# Patient Record
Sex: MALE
[De-identification: names, ages, dates, MRNs, and addresses within clinical notes are randomized per-mention and may not be internally consistent; named-entity substitution may affect disease eponyms.]

---

## 2020-06-17 ENCOUNTER — HOSPITAL ENCOUNTER (INPATIENT)
Dept: HOSPITAL 53 - M PCU | Age: 20
LOS: 1 days | Discharge: HOME | DRG: 101 | End: 2020-06-18
Attending: INTERNAL MEDICINE | Admitting: INTERNAL MEDICINE
Payer: COMMERCIAL

## 2020-06-17 VITALS — BODY MASS INDEX: 19.47 KG/M2 | HEIGHT: 74 IN | WEIGHT: 151.68 LBS

## 2020-06-17 VITALS — SYSTOLIC BLOOD PRESSURE: 128 MMHG | DIASTOLIC BLOOD PRESSURE: 58 MMHG

## 2020-06-17 VITALS — DIASTOLIC BLOOD PRESSURE: 58 MMHG | SYSTOLIC BLOOD PRESSURE: 123 MMHG

## 2020-06-17 DIAGNOSIS — D72.829: ICD-10-CM

## 2020-06-17 DIAGNOSIS — R56.9: Primary | ICD-10-CM

## 2020-06-17 LAB
ALBUMIN SERPL BCG-MCNC: 4.1 GM/DL (ref 3.2–5.2)
ALT SERPL W P-5'-P-CCNC: 33 U/L (ref 12–78)
BILIRUB SERPL-MCNC: 0.8 MG/DL (ref 0.2–1)
BUN SERPL-MCNC: 18 MG/DL (ref 7–18)
CALCIUM SERPL-MCNC: 8.8 MG/DL (ref 8.5–10.1)
CHLORIDE SERPL-SCNC: 107 MEQ/L (ref 98–107)
CO2 SERPL-SCNC: 26 MEQ/L (ref 21–32)
CREAT SERPL-MCNC: 0.84 MG/DL (ref 0.7–1.3)
GFR SERPL CREATININE-BSD FRML MDRD: > 60 ML/MIN/{1.73_M2} (ref 60–?)
GLUCOSE SERPL-MCNC: 96 MG/DL (ref 70–100)
HCT VFR BLD AUTO: 40.2 % (ref 42–52)
HGB BLD-MCNC: 13.2 G/DL (ref 13.5–17.5)
INR PPP: 1.27
MCH RBC QN AUTO: 29.7 PG (ref 27–33)
MCHC RBC AUTO-ENTMCNC: 32.8 G/DL (ref 32–36.5)
MCV RBC AUTO: 90.5 FL (ref 80–96)
PLATELET # BLD AUTO: 207 10^3/UL (ref 150–450)
POTASSIUM SERPL-SCNC: 4.1 MEQ/L (ref 3.5–5.1)
PROT SERPL-MCNC: 6.9 GM/DL (ref 6.4–8.2)
PROTHROMBIN TIME: 15.6 SECONDS (ref 11.8–14)
RBC # BLD AUTO: 4.44 10^6/UL (ref 4.3–6.1)
SODIUM SERPL-SCNC: 137 MEQ/L (ref 136–145)
WBC # BLD AUTO: 11.8 10^3/UL (ref 4–10)

## 2020-06-17 RX ADMIN — ACETAMINOPHEN PRN MG: 325 TABLET ORAL at 18:08

## 2020-06-17 RX ADMIN — ONDANSETRON PRN MG: 2 INJECTION INTRAMUSCULAR; INTRAVENOUS at 18:08

## 2020-06-17 RX ADMIN — ACETAMINOPHEN PRN MG: 325 TABLET ORAL at 23:50

## 2020-06-17 RX ADMIN — LEVETIRACETAM SCH MLS/HR: 500 INJECTION, SOLUTION, CONCENTRATE INTRAVENOUS at 20:50

## 2020-06-17 NOTE — REP
Portable chest x-ray:  Single view.

 

History:  Altered mental status.

 

Findings:  The lungs are well inflated and clear.  The pleural angles are sharp.

Heart size is normal.  Pulmonary vasculature is not increased.  No acute bony

abnormality is seen.

 

Impression:

 

Negative portable chest x-ray.

 

 

Electronically Signed by

Wade Rojas MD 06/17/2020 04:38 P

## 2020-06-17 NOTE — REPVR
PROCEDURE INFORMATION: 

Exam: MR Head Without Contrast 

Exam date and time: 6/17/2020 8:01 PM 

Age: 20 years old 

Clinical indication: Screening exam; Additional info: Seizure 



TECHNIQUE: 

Imaging protocol: MR of the head without contrast. 



COMPARISON: 

No relevant prior studies available. 



FINDINGS: 

Brain: Normal. No acute infarct. No hemorrhage. No significant white matter 

disease. No edema.  

Ventricles: Normal. No ventriculomegaly. 

Bones/joints: Unremarkable. 

Sinuses: Normal as visualized. No acute sinusitis. 

Mastoid air cells: Normal as visualized. No mastoid effusion. 

Orbits: Unremarkable. 



Soft tissues: Unremarkable. 



IMPRESSION: 

No acute intracranial abnormality.



Electronically signed by: Terrence Huffman On 06/17/2020  20:34:47 PM

## 2020-06-17 NOTE — HPEPDOC
General


Date of Admission


Jun 17, 2020 at 15:03


Date of Service:  Jun 17, 2020


Chief Complaint


The patient is a 20-year-old male who presented to [facility] as a transfer from 

[facility]





History of Present Illness


Patient is a 20-year-old  male with no significant past medical history

was presented to emergency Medical Center as a transfer from [facility]. Patient was reported to have a seizure episode in the field and was 

brought to the emergency room for further evaluation.





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 

He reported that he wasnt feeling well prior to the explosion. Patient reported

that he would diffuse and was walking to [place] getting to [place] 

went off, and the next thing patient remembered was being on the ground.





Patient was advised by his Rogelio that he had a seizure. Patient denied any 

tongue biting. Denied any loss of control and his bowel or bladder.  does 

report a remote history of a seizure as a child. However, he attributes that to 

an infection.





While in the emergency room a [facility] patient developed another 

seizure episode, and was given Ativan for control. Patient was given a total of 

4 mg of Ativan and 1000 mg of Keppra and transferred to [facility]

for further evaluation, after discussion with neurology.





Currently patient denies any lightheadedness, dizziness, nausea, vomiting, chest

pain, shortness breath, cough, fevers, chills, abdominal pain, constipation, 

diarrhea or urinary discomfort.





Patient reports his appetite is normal. Reports some increase in his weight 

secondary to the weight gain program that hes using.





Home Medications


No Active Prescriptions or Reported Meds





Allergies


Coded Allergies:  


     No Known Allergies (Unverified , 6/17/20)





Past Medical History


Medical History


No significant past medical history


Surgical History


Right hand fracture status post point





Family History


- No family history of seizures or malignancy in his parents


- Maternal grandmother with a history of lung cancer





Social History


- Denies the use of tobacco or illicit drugs; she reports social alcohol use


- Denies recent travel or sick contacts


- Lives at [facility]





Review of Systems


Other systems


10 point review of systems complete, all negative otherwise stated in HPI





Vital Signs


- Vitals: /58, HR 83, RR 20, Sat 20, Temp 98.4F


- General: Lying in bed, No acute distress, Speaking in full sentences, AAOx3


- HEENT: NC, AT, PERRLA, EOMI


- CVS: RRR, +S1S2


- Lungs: Fair air entry bilaterally, No appreciable wheezing / rales / rhonchi 


- Abdomen: Soft, Non-distended, Non-tender


- Extremities: No lower extremity edema, No calf tenderness


- Neuro: No focal motor or sensory deficit


- Skin: No visible rashes





Laboratory Data


Labs 24H


Laboratory Tests 2


6/17/20 15:28: Nucleated Red Blood Cells % (auto) 0.0


CBC/BMP


Laboratory Tests


6/17/20 15:28








Microbiology





Microbiology


6/17/20 Blood Culture, Received


          Pending


6/17/20 Blood Culture, Received


          Pending





Plan / VTE


VTE Prophylaxis Ordered?:  Yes





Plan


Plan


New onset seizure


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 

[employer]


- Patient subsequently developed another seizure episode while in the emergency 

room and [facility]


- Patients history reveals a remote history of seizure episode at age 1, 

possibly secondary to infection


- CT head 6/17: No acute findings. A [facility]


- Will check ECHO / c/w Telemetry monitoring 


- Case was discussed with neurology; will get MRI / EEG 


- Will start Keppra 500 BID


- c/w Seizure precautions 





Leukocytosis - likely 2/2 reactive etiology, less likely 2/2 infectious etiology


- Review of systems negative for any source of infection


- Patient is hemodynamically stable and afebrile


- UA completed a [facility] was negative for any infectious causes


- Chest x-ray completed a [facility] was negative for any acute 

findings


- Will repeat UA


- Will hold off on antibiotics





Questionable A. fib history


- Currently patient denies any chest pain, shortness of breath or palpitations


- Patient is currently in sinus rhythm with appropriate rate


- EKG will be repeated here


- Will trend troponins


- Will hold off on full anticoagulation/rate control





DVT prophylaxis 


- Will start TEDs/SARAH Marx MD                Jun 17, 2020 16:05

## 2020-06-18 VITALS — DIASTOLIC BLOOD PRESSURE: 58 MMHG | SYSTOLIC BLOOD PRESSURE: 119 MMHG

## 2020-06-18 VITALS — DIASTOLIC BLOOD PRESSURE: 56 MMHG | SYSTOLIC BLOOD PRESSURE: 56 MMHG

## 2020-06-18 VITALS — SYSTOLIC BLOOD PRESSURE: 116 MMHG | DIASTOLIC BLOOD PRESSURE: 56 MMHG

## 2020-06-18 VITALS — DIASTOLIC BLOOD PRESSURE: 49 MMHG | SYSTOLIC BLOOD PRESSURE: 114 MMHG

## 2020-06-18 LAB
BASOPHILS # BLD AUTO: 0 10^3/UL (ref 0–0.2)
BASOPHILS NFR BLD AUTO: 0.2 % (ref 0–1)
BUN SERPL-MCNC: 15 MG/DL (ref 7–18)
CALCIUM SERPL-MCNC: 8.6 MG/DL (ref 8.5–10.1)
CHLORIDE SERPL-SCNC: 107 MEQ/L (ref 98–107)
CK SERPL-CCNC: 1342 U/L (ref 39–308)
CO2 SERPL-SCNC: 29 MEQ/L (ref 21–32)
CREAT SERPL-MCNC: 0.86 MG/DL (ref 0.7–1.3)
EOSINOPHIL # BLD AUTO: 0.1 10^3/UL (ref 0–0.5)
EOSINOPHIL NFR BLD AUTO: 1.1 % (ref 0–3)
GFR SERPL CREATININE-BSD FRML MDRD: > 60 ML/MIN/{1.73_M2} (ref 60–?)
GLUCOSE SERPL-MCNC: 91 MG/DL (ref 70–100)
HCT VFR BLD AUTO: 39.8 % (ref 42–52)
HGB BLD-MCNC: 13 G/DL (ref 13.5–17.5)
LYMPHOCYTES # BLD AUTO: 2.4 10^3/UL (ref 1.5–5)
LYMPHOCYTES NFR BLD AUTO: 25.6 % (ref 24–44)
MAGNESIUM SERPL-MCNC: 2.4 MG/DL (ref 1.8–2.4)
MCH RBC QN AUTO: 29.6 PG (ref 27–33)
MCHC RBC AUTO-ENTMCNC: 32.7 G/DL (ref 32–36.5)
MCV RBC AUTO: 90.7 FL (ref 80–96)
MONOCYTES # BLD AUTO: 0.9 10^3/UL (ref 0–0.8)
MONOCYTES NFR BLD AUTO: 9.3 % (ref 0–5)
NEUTROPHILS # BLD AUTO: 6 10^3/UL (ref 1.5–8.5)
NEUTROPHILS NFR BLD AUTO: 63.6 % (ref 36–66)
PLATELET # BLD AUTO: 215 10^3/UL (ref 150–450)
POTASSIUM SERPL-SCNC: 3.6 MEQ/L (ref 3.5–5.1)
RBC # BLD AUTO: 4.39 10^6/UL (ref 4.3–6.1)
SODIUM SERPL-SCNC: 138 MEQ/L (ref 136–145)
WBC # BLD AUTO: 9.4 10^3/UL (ref 4–10)

## 2020-06-18 RX ADMIN — LEVETIRACETAM SCH MLS/HR: 500 INJECTION, SOLUTION, CONCENTRATE INTRAVENOUS at 09:14

## 2020-06-18 RX ADMIN — ONDANSETRON PRN MG: 2 INJECTION INTRAMUSCULAR; INTRAVENOUS at 10:41

## 2020-06-18 NOTE — DS.PDOC
Discharge Summary


General


Date of Admission


Jun 17, 2020 at 15:03


Date of Discharge


6/18/20





Discharge Summary


PROCEDURES PERFORMED DURING STAY: [None].





ADMITTING DIAGNOSES: 


New onset seizure


Leukocytosis 





DISCHARGE DIAGNOSES:


New onset seizure


Leukocytosis 





COMPLICATIONS/CHIEF COMPLAINT: Seizure.





HISTORY OF PRESENT ILLNESS: Patient is a 20-year-old  male with no 

significant past medical history was presented to emergency Medical Center as a 

transfer from [facility]. Patient was reported to have a seizure 

episode in the field and was brought to the emergency room for further 

evaluation.





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 

He reported that he wasnt feeling well prior to the explosion. Patient reported

 that he would diffuse and was walking to [place] on getting to [place] explosion 

went off, and the next thing patient remembered was being on the ground.





Patient was advised by his Rogelio that he had a seizure. Patient denied any 

tongue biting. Denied any loss of control and his bowel or bladder.  does 

report a remote history of a seizure as a child. However, he attributes that to 

an infection.





While in the emergency room a [facility] patient developed another 

seizure episode, and was given Ativan for control. Patient was given a total of 

4 mg of Ativan and 1000 mg of Keppra and transferred to [facility]

 for further evaluation, after discussion with neurology.





Currently patient denies any lightheadedness, dizziness, nausea, vomiting, chest

 pain, shortness breath, cough, fevers, chills, abdominal pain, constipation, 

diarrhea or urinary discomfort.





Patient reports his appetite is normal. Reports some increase in his weight 

secondary to the weight gain program that hes using.








HOSPITAL COURSE: During hospital stay following issues addressed


New onset seizure


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 

[employer] range


- Patient subsequently developed another seizure episode while in the emergency 

room and [facility]


- Patients history reveals a remote history of seizure episode at age 1, 

possibly secondary to infection


- CT head 6/17: No acute findings. A [facility]


- ECHO negative


- Case was discussed with neurology; MRI negative / EEG pending


- Will start Keppra 500 BID








Leukocytosis - likely 2/2 reactive etiology, less likely 2/2 infectious etiology


- Review of systems negative for any source of infection


- Patient is hemodynamically stable and afebrile


- UA completed a [facility] was negative for any infectious causes


- Chest x-ray completed a [facility] was negative for any acute 

findings


- Will repeat UA


- Will hold off on antibiotics








DISCHARGE MEDICATIONS: Please see below.


 


ALLERGIES: Please see below.





PHYSICAL EXAMINATION ON DISCHARGE:


VITAL SIGNS: Please see below.


 General: Lying in bed, No acute distress, Speaking in full sentences, AAOx3


- HEENT: NC, AT, PERRLA, EOMI


- CVS: RRR, +S1S2


- Lungs: Fair air entry bilaterally, No appreciable wheezing / rales / rhonchi 


- Abdomen: Soft, Non-distended, Non-tender


- Extremities: No lower extremity edema, No calf tenderness


- Neuro: No focal motor or sensory deficit


- Skin: No visible rashes











LABORATORY DATA: Please see below.





IMAGING:


PROCEDURE INFORMATION: 


Exam: MR Head Without Contrast 


Exam date and time: 6/17/2020 8:01 PM 


Age: 20 years old 


Clinical indication: Screening exam; Additional info: Seizure 





TECHNIQUE: 


Imaging protocol: MR of the head without contrast. 





COMPARISON: 


No relevant prior studies available. 





FINDINGS: 


Brain: Normal. No acute infarct. No hemorrhage. No significant white matter 


disease. No edema.  


Ventricles: Normal. No ventriculomegaly. 


Bones/joints: Unremarkable. 


Sinuses: Normal as visualized. No acute sinusitis. 


Mastoid air cells: Normal as visualized. No mastoid effusion. 


Orbits: Unremarkable. 





Soft tissues: Unremarkable. 





IMPRESSION: 


No acute intracranial abnormality.





Electronically signed by: Terrence Huffman On 06/17/2020  20:34:47 PM





DD:  TERRENCE PABON RAI, DO  06/17/20 2001


DT:  NILTON  06/17/20 2034  


DS:  ART 06/17/20 2034 


        


         





PROGNOSIS: Good





ACTIVITY: [As tolerated].





DIET: Regular 











DISPOSITION: 01 Home, Self-Care.





DISCHARGE INSTRUCTIONS:


BMP in 3-5 days, creatinine phosphokinase in 3 days, follow-up with neurologist 

and PCP


Drink plenty of fluid for next 7 days








DISCHARGE CONDITION: [Stable].





TIME SPENT ON DISCHARGE: Greater than 20 minutes.





Vital Signs/I&Os





Vital Signs








  Date Time  Temp Pulse Resp B/P (MAP) Pulse Ox O2 Delivery O2 Flow Rate FiO2


 


6/18/20 12:00 98.5 55 20 107/56 (73) 100 Room Air  














I&O- Last 24 Hours up to 6 AM 


 


 6/18/20





 06:00


 


Intake Total 120 ml


 


Output Total 300 ml


 


Balance -180 ml











Laboratory Data


Labs 24H


Laboratory Tests 2


6/17/20 19:21: 


Urine Color YELLOW, Urine Appearance CLEAR, Urine pH 6.0, Urine Specific Gravity

 1.021, Urine Protein NEGATIVE, Urine Glucose (UA) NEGATIVE, Urine Ketones 1+H, 

Urine Blood 1+H, Urine Nitrite NEGATIVE, Urine Bilirubin NEGATIVE, Urine 

Urobilinogen 0.2, Urine Leukocyte Esterase NEGATIVE, Urine WBC (Auto) 1, Urine 

RBC (Auto) 23H, Urine Hyaline Casts (Auto) 0, Urine Bacteria (Auto) NEGATIVE, 

Urine Squamous Epithelial Cells 0, Urine Mucus (Auto) SMALL, Urine Sperm (Auto) 


6/18/20 05:11: 


Immature Granulocyte % (Auto) 0.2, Neutrophils (%) (Auto) 63.6, Lymphocytes (%) 

(Auto) 25.6, Monocytes (%) (Auto) 9.3H, Eosinophils (%) (Auto) 1.1, Basophils 

(%) (Auto) 0.2, Neutrophils # (Auto) 6.0, Lymphocytes # (Auto) 2.4, Monocytes # 

(Auto) 0.9H, Eosinophils # (Auto) 0.1, Basophils # (Auto) 0.0, Nucleated Red Bl

ood Cells % (auto) 0.0, Anion Gap 2L, Glomerular Filtration Rate > 60.0, Calcium

 Level 8.6, Magnesium Level 2.4, Total Creatine Kinase 1342H


CBC/BMP


Laboratory Tests


6/18/20 05:11











Microbiology





Microbiology


6/17/20 Blood Culture - Preliminary, Resulted


          No growth after 24 hours . All specim...


6/17/20 Blood Culture - Preliminary, Resulted


          No growth after 24 hours . All specim...





Discharge Medications


Scheduled


Levetiracetam (Keppra) 500 Mg Tablet, 500 MG PO BID





Allergies


Coded Allergies:  


     No Known Allergies (Unverified , 6/17/20)











KINDRA GALLOWAY DO            Jun 18, 2020 16:44

## 2020-06-18 NOTE — ECGEPIP
[facility]

                                       

                                       Test Date:    2020

Pat Name:     SOTO FERNANDEZ         Department:   

Patient ID:   [id number]                 Room:         [place] [address]

Gender:       Male                     Technician:   ANABELA

:          2000               Requested By: SARAH HIGGINS 

Order Number: [id number]     Reading MD:   Rubin Holt

                                 Measurements

Intervals                              Axis          

Rate:         74                       P:            79

AL:           144                      QRS:          97

QRSD:         95                       T:            69

QT:           392                                    

QTc:          435                                    

                           Interpretive Statements

Normal sinus rhythm with sinus arrhythmia

Early repolarization

Comparison tracing not on file

Electronically Signed on 2020 10:55:02 EDT by Rubin Holt

## 2020-06-18 NOTE — ECHO
DATE OF PROCEDURE:  06/17/2020

 

REFERRING PHYSICIAN:  Dr. Gabbi Portillo.

 

REASON FOR THE STUDY:  Abnormal EKG.

 

2D MEASUREMENT:

IVS - 1.0 cm

LV - 4.2 cm

LVPW - 1.0 cm

LA - 3.3 cm

Aorta - 2.8 cm

IVC - 2.7 cm

 

DOPPLER MEASUREMENT:

Peak velocity across the aortic valve - 1.8 m/s

Peak velocity across the LVOT - 1.4 m/s

Mitral E 1.4, Mitral A - 0.51 with a ratio of greater than 1.0

 

2D COMMENTS:

1.  Normal left ventricular size, wall thickness, and normal global left

ventricular systolic function.  The estimated systolic ejection fraction is

60-65%.

 

2.  Normal left atrium.  Normal right atrium and right ventricle.

 

3.  The atrial septum appeared to be normal without evidence of defect or shunt.

 

4.   Normal aortic root.

 

5.  No pericardial effusion seen.

 

6.  The aortic valve, as well as mitral valve, tricuspid valve, and pulmonic

valve appeared to be normal.  The proximal pulmonary artery branches were not

well visualized.

 

7.  The inferior vena cava was dilated, central venous pressure mildly elevated.

 

Doppler detects on the trace mitral regurgitation and trace tricuspid

regurgitation.  The calculated pulmonary artery systolic pressure was normal.

Assessment of the left ventricular diastolic function was normal.

 

IMPRESSION:

1.  Normal global left ventricular systolic and diastolic function.

 

2.  No significant valvular heart disease but trace mitral regurgitation.

 

3.  The inferior vena cava was mildly enlarged, central venous pressure might be

elevated.

## 2020-06-19 NOTE — EEG
DATE OF EE2020

 

REFERRING PHYSICIAN:  Dr. Gabbi Portillo

 

DIAGNOSIS:  Seizure.

 

EEG NUMBER:  [id number]

 

HISTORY:

The patient is a 20-year-old male with seizure-like spells who was admitted at

[facility].  He is currently on Keppra, Tylenol etc.

 

TECHNICAL DESCRIPTION:

This digital EEG was recorded by 21 scalp, ear and two EKG electrodes and was

reviewed in bipolar and referential montages following reformatting in 10-20

international electrode placement system.

 

INTERPRETATION:

The patient was noted to be in awake and drowsy states during this EEG.  Resting

awake background rhythm consisted of well-formed posterior dominant rhythm with

anterior/posterior gradient comprising of 9 Hz alpha activity measuring 15-40

microvolts in amplitude, which was symmetric and reactive to eye opening.

Attenuation of posterior dominant rhythm was seen during transition into

drowsiness.  No sleep was achieved.  Hyperventilation with good effort remained

unremarkable.  Photic stimulation at 3-30 Hz elicited symmetric photic driving

especially at mid frequencies.  No focal, lateralize or epileptiform

abnormalities were seen.  No relevant clinical activity was noted.  EKG revealed

normal sinus rhythm.

 

CONCLUSION:

This EEG in awake and drowsy states is within normal limits.

## 2021-01-15 ENCOUNTER — HOSPITAL ENCOUNTER (INPATIENT)
Dept: HOSPITAL 53 - M ED | Age: 21
LOS: 7 days | Discharge: HOME | DRG: 881 | End: 2021-01-22
Attending: PSYCHIATRY & NEUROLOGY | Admitting: PSYCHIATRY & NEUROLOGY
Payer: COMMERCIAL

## 2021-01-15 VITALS — HEIGHT: 74 IN | BODY MASS INDEX: 19.15 KG/M2 | WEIGHT: 149.25 LBS

## 2021-01-15 DIAGNOSIS — R45.851: ICD-10-CM

## 2021-01-15 DIAGNOSIS — F32.9: Primary | ICD-10-CM

## 2021-01-15 LAB
ALBUMIN SERPL BCG-MCNC: 4.4 GM/DL (ref 3.2–5.2)
ALT SERPL W P-5'-P-CCNC: 21 U/L (ref 12–78)
AMPHETAMINES UR QL SCN: NEGATIVE
APAP SERPL-MCNC: < 2 UG/ML (ref 10–30)
BARBITURATES UR QL SCN: NEGATIVE
BENZODIAZ UR QL SCN: NEGATIVE
BILIRUB CONJ SERPL-MCNC: 0.3 MG/DL (ref 0–0.2)
BILIRUB SERPL-MCNC: 1.1 MG/DL (ref 0.2–1)
BUN SERPL-MCNC: 18 MG/DL (ref 7–18)
BZE UR QL SCN: NEGATIVE
CALCIUM SERPL-MCNC: 9.6 MG/DL (ref 8.5–10.1)
CANNABINOIDS UR QL SCN: NEGATIVE
CHLORIDE SERPL-SCNC: 102 MEQ/L (ref 98–107)
CO2 SERPL-SCNC: 31 MEQ/L (ref 21–32)
CREAT SERPL-MCNC: 0.96 MG/DL (ref 0.7–1.3)
ETHANOL SERPL-MCNC: < 0.003 % (ref 0–0.01)
GFR SERPL CREATININE-BSD FRML MDRD: > 60 ML/MIN/{1.73_M2} (ref 60–?)
GLUCOSE SERPL-MCNC: 87 MG/DL (ref 70–100)
HCT VFR BLD AUTO: 45.1 % (ref 42–52)
HGB BLD-MCNC: 14.2 G/DL (ref 13.5–17.5)
MCH RBC QN AUTO: 29 PG (ref 27–33)
MCHC RBC AUTO-ENTMCNC: 31.5 G/DL (ref 32–36.5)
MCV RBC AUTO: 92.2 FL (ref 80–96)
METHADONE UR QL SCN: NEGATIVE
OPIATES UR QL SCN: NEGATIVE
PCP UR QL SCN: NEGATIVE
PLATELET # BLD AUTO: 222 10^3/UL (ref 150–450)
POTASSIUM SERPL-SCNC: 4.6 MEQ/L (ref 3.5–5.1)
PROT SERPL-MCNC: 7.3 GM/DL (ref 6.4–8.2)
RBC # BLD AUTO: 4.89 10^6/UL (ref 4.3–6.1)
SALICYLATES SERPL-MCNC: < 1.7 MG/DL (ref 5–30)
SODIUM SERPL-SCNC: 137 MEQ/L (ref 136–145)
TSH SERPL DL<=0.005 MIU/L-ACNC: 0.7 UIU/ML (ref 0.36–3.74)
WBC # BLD AUTO: 10.2 10^3/UL (ref 4–10)

## 2021-01-15 NOTE — CCD
Summarization Of Episode

                             Created on: 01/15/2021



SOTO FERNANDEZ

External Reference #: [id number]

: 2000

Sex: Male



Demographics





                          Address                   [address] [address] [place] [address] [address]

[place]

[place]  [address]

 

                          Home Phone                [phone]

 

                          Preferred Language        English

 

                          Marital Status            Single

 

                          Buddhism Affiliation     NONE

 

                          Race                      Other Race

 

                          Ethnic Group              Not  or 





Author





                          Author                    [facility]

 

                          Organization              [facility]

 

                          Address                   Unknown

 

                          Phone                     Unavailable







Support





                Name            Relationship    Address         Phone

 

                    JIMSUSANA     Next Of Kin         [address] [place]  [address]                  [phone]

 

                    [facility]             Next Of Kin         [address]

[place]

[place]  [address]                    Unavailable

 

                    IKER ULRICH     Next Of Kin         -

                                        -

                          -, -  -                   [phone]







Care Team Providers





                    Care Team Member Name Role                Phone

 

                    TURRIN,  JB   Unavailable         Unavailable

 

                    TURRIN,  JB   Unavailable         Unavailable

 

                    TURRIN,  JB   Unavailable         Unavailable

 

                    TURRIN,  JB   Unavailable         Unavailable

 

                    Ali, Mohsin MD     Unavailable         Unavailable

 

                    Ali, Mohsin MD     Unavailable         Unavailable

 

                    Ali, Mohsin MD     Unavailable         Unavailable

 

                    Ali, Mohsin MD     Unavailable         Unavailable

 

                    Ali, Mohsin MD     Unavailable         Unavailable

 

                    Ali, Mohsin MD     Unavailable         Unavailable

 

                    Ali, Mohsin MD     Unavailable         Unavailable

 

                    Ali, Mohsin MD     Unavailable         Unavailable

 

                    Ali, Mohsin MD     Unavailable         Unavailable

 

                    Ali, Mohsin MD     Unavailable         Unavailable

 

                    Ali, Mohsin MD     Unavailable         Unavailable

 

                    Ali, Mohsin MD     Unavailable         Unavailable

 

                    Ali, Mohsin MD     Unavailable         Unavailable

 

                    Ali, Mohsin MD     Unavailable         Unavailable

 

                    Ali, Mohsin MD     Unavailable         Unavailable

 

                    Ali, Mohsin MD     Unavailable         Unavailable

 

                    Ali, Mohsin MD     Unavailable         Unavailable

 

                    Ali, Mohsin MD     Unavailable         Unavailable

 

                    Ali, Mohsin MD     Unavailable         Unavailable

 

                    Ali, Mohsin MD     Unavailable         Unavailable

 

                    Ali, Mohsin MD     Unavailable         Unavailable

 

                    Ali, Mohsin MD     Unavailable         Unavailable

 

                    Ali, Mohsin MD     Unavailable         Unavailable

 

                    Ali, Mohsin MD     Unavailable         Unavailable

 

                    Ali, Mohsin MD     Unavailable         Unavailable

 

                    Ali, Mohsin MD     Unavailable         Unavailable

 

                    Ali, Mohsin MD     Unavailable         Unavailable

 

                    Ali, Mohsin MD     Unavailable         Unavailable

 

                    Ali, Mohsin MD     Unavailable         Unavailable

 

                    Ali, Mohsin MD     Unavailable         Unavailable

 

                    Ali, Mohsin MD     Unavailable         Unavailable

 

                    Ali, Mohsin MD     Unavailable         Unavailable

 

                    Ali, Mohsin MD     Unavailable         Unavailable

 

                    Ali, Mohsin MD     Unavailable         Unavailable

 

                    Ali, Mohsin MD     Unavailable         Unavailable

 

                    Ali, Mohsin MD     Unavailable         Unavailable

 

                    Ali, Mohsin MD     Unavailable         Unavailable

 

                    Ali, Mohsin MD     Unavailable         Unavailable

 

                    Ali, Mohsin MD     Unavailable         Unavailable

 

                    Ali, Mohsin MD     Unavailable         Unavailable

 

                    Ali,  Mohsin MD     Unavailable         Unavailable

 

                    Ali,  Mohsin MD     Unavailable         Unavailable

 

                    Ali,  Mohsin MD     Unavailable         Unavailable

 

                    Ali,  Mohsin MD     Unavailable         Unavailable

 

                    Ali,  Mohsin MD     Unavailable         Unavailable

 

                    Ali,  Mohsin MD     Unavailable         Unavailable

 

                    Ali,  Mohsin MD     Unavailable         Unavailable

 

                    Ali,  Mohsin MD     Unavailable         Unavailable

 

                    Ali,  Mohsin MD     Unavailable         Unavailable

 

                    Ali,  Mohsin MD     Unavailable         Unavailable

 

                    Ali,  Mohsin MD     Unavailable         Unavailable

 

                    Ali,  Mohsin MD     Unavailable         Unavailable



                                  



Re-disclosure Warning

          The records that you are about to access may contain information from 
federally-assisted alcohol or drug abuse programs. If such information is 
present, then the following federally mandated warning applies: This information
has been disclosed to you from records protected by federal confidentiality 
rules (42 CFR part 2). The federal rules prohibit you from making any further 
disclosure of this information unless further disclosure is expressly permitted 
by the written consent of the person to whom it pertains or as otherwise 
permitted by 42 CFR part 2. A general authorization for the release of medical 
or other information is NOT sufficient for this purpose. The Federal rules 
restrict any use of the information to criminally investigate or prosecute any 
alcohol or drug abuse patient.The records that you are about to access may 
contain highly sensitive health information, the redisclosure of which is 
protected by Article 27-F of the [facility] Public Health law. If you 
continue you may have access to information: Regarding HIV / AIDS; Provided by 
facilities licensed or operated by the [facility] Office of Mental Health; 
or Provided by the [facility] Office for People With Developmental 
Disabilities. If such information is present, then the following [place] 
mandated warning applies: This information has been disclosed to you from 
confidential records which are protected by state law. State law prohibits you 
from making any further disclosure of this information without the specific 
written consent of the person to whom it pertains, or as otherwise permitted by 
law. Any unauthorized further disclosure in violation of state law may result in
a fine or snf sentence or both. A general authorization for the release of 
medical or other information is NOT sufficient authorization for further disc
losure.                                                                         
    



Encounters

          



           Encounter  Providers  Location   Date       Indications Data Source(s

)

 

                Outpatient      Attender: Mohsin Ali MD Main office [facility] 

2020 10:30:00 

AM EDT                                              [facility] ([facility])

 

                Outpatient      Attender: Mohsin Ali MD Main office - [place] 

2020 02:30:00 

PM EDT                                              [facility] ([facility]

[facility]

 

                Emergency       Attender: JB MOONNUZHAT                 2020 10:02:00 AM EDT - 2020 

02:31:00 PM EDT                                     [facility]

 

                                        Patient discharged. 



                                                                                
                           



Medications

          



          Medication Brand Name Start Date Product Form Dose      Route     Admi

nistrative 

Instructions Pharmacy Instructions Status     Indications Reaction   Description

 Data 

Source(s)

 

     No Active Medications      2020 12:00:00 AM EDT                      

    active                [facility]

([facility])

 

           Levetiracetam 500 MG Oral Tablet Levetiracetam 2020 12:00:00 AM

 EDT                       

ORAL                          completed                               [facility]



                                                                              



Insurance Providers

          



             Payer name   Policy type / Coverage type Policy ID    Covered party

 ID Covered 

party's relationship to arreguin Policy Arreguin             Plan Information

 

          [facility] [employer]           [id number]           SP             

     [id number]

 

          [facility] [employer] - O/P           [id number]           [id number]            

      [id number]



                                                                                
                 



Problems, Conditions, and Diagnoses

          



           Code       Display Name Description Problem Type Effective Dates Data

 Source(s)

 

             201630196    Isolated seizures Isolated seizures Problem       12:00:00 AM EDT

                                        [facility] ([facility])

 

                29322650        Generalized convulsive epilepsy Generalized conv

ulsive epilepsy Problem

                          2020 12:00:00 AM EDT [facility] ([facility]

[facility], )

 

             R569         Unspecified convulsions Unspecified convulsions Diagno

sis    2020 

10:02:00 AM EDT                         [facility]

 

             R55          Syncope and collapse Syncope and collapse Diagnosis   

 2020 10:02:00 AM 

EDT                                     [facility]



                                                                                
                                               



Surgeries/Procedures

          



             Procedure    Description  Date         Indications  Data Source(s)

 

             EEG Complete STD Phys/QHP>36 HR<60 HR W/O Video              2020 12:00:00 AM EDT              

[facility] ([facility])

 

             EEG Complete STD Phys/QHP>36 HR<60 HR W/O Video              2020 12:00:00 AM EDT              

[facility] ([facility])

 

             EEG Complete STD Phys/QHP>36 HR<60 HR W/O Video              2020 12:00:00 AM EDT              

[facility] ([facility])

 

             EEG Complete STD Phys/QHP>36 HR<60 HR W/O Video              2020 12:00:00 AM EDT              

[facility] ([facility], )



                                                                                
                                     



Results

          



                    ID                  Date                Data Source

 

                    [id number]     2020 09:27:00 AM EDT [facility] [address] [place]

[place] [address] PHONE: [phone]

FAX: [phone]  Name .................. : JIM ZAPATA              
Acct Number.................. : [id number] [facility]... : [id number] Stay type .............
: E/R                             Discharge Date......... ... : 20 Admit 
Date ......... : 20                           Admit Phys 
.................... : TURRIN COREY Date of Birth ....... : 2000            
           Family Phys ................... : UNKNOWN CO Phone ..................
: [phone]                   Age ................................ : 20 Film#
.................. .:[id number]                         Sex 
................................. : M  Unsigned transcriptions are preliminary 
reports and do not represent a medical or legal document        CT CERV SPINE 
W/O CONTRAS [id number]       COMPLETE:20 16:19 TRENT 34439                       
      Reason(s): syncope with fall     CT OF THE CERVICAL SPINE WITHOUT 
CONTRAST:  INDICATION: Syncope with fall.  FINDINGS: There is straightening of 
the normal lordotic curvature. This could indicate underlying spasm. No clear 
evidence of an acute fracture is identified. The visualized portions of the 
upper lung fields are clear. The soft tissues of the neck are within normal 
limits.  IMPRESSION: Reversal of the normal lordosis likely secondary to 
underlying spasm. Examination is otherwise unremarkable. No acute findings are 
otherwise identified.  While performing the above CT examination, radiation dose
reduction was accomplished utilizing automated exposure control, adjusting of 
the mA and kV based on the patient's body size and/or the use of imperative 
reconstructive techniques.  CT dose: 194.3 mGycm  Examination dictated by 
ILA Sanchez. Examination was reviewed with Jc Flynn MD, 
radiologist at the time of this dictation.   ___________________________________
Electronically Reviewed and Signed By JC FLYNN MD                      
     , 20 09:27, [facility]  Transcribe Initials: KELL , Transcribe Date: 20 
01:13, Dictation Date:   Copy for: NEL TAPIA                 via fax 
Copy for: EMERGENCY DEPT                  via modem Copy for: [address] [employer] REC       
                                                                                
         Page 1 of 2 [facility] [address] [place] [address]
PHONE: [phone] FAX: [phone]  Name .................. : JIM ZAPATA           Acct Number.................. : [id number] ROOM. ........
......... : TR-05                         Number ................... : [id number] 
Stay type ............. : E/R                          Discharge Date......... 
... : 20 Admit Date ......... : 20                        Admit Phys
.................... : CESAR NICOLE Date of Birth ....... : 2000            
        Family Phys ................... : UNKNOWN CO Phone .................. : 
[phone]                Age ................................ : 20 Film# 
.................. .:[id number]                      Sex 
................................. : M  Unsigned transcriptions are preliminary 
reports and do not represent a medical or legal document        CT CERV SPINE 
W/O CONTRAS 40770       COMPLETE:20 16:19 TRENT 35455                       
      Reason(s): syncope with fall   DISCHARGED                                 
                                                                Page 2 of 2   









          Name      Value     Range     Interpretation Code Description Data Anabella

rce(s) Supporting 

Document(s)

 

                                                                       









                    ID                  Date                Data Source

 

                    [id number]     2020 09:27:00 AM EDT [facility] [address] [place] [address] PHONE: [phone]

FAX: [phone]  Name .................. : JIM ZAPATA              
Acct Number.................. : [id number] ROOM. ................. : TR05        
                   Number ................... : [id number] Stay type .............
: E/R                             Discharge Date......... ... : 20 Admit 
Date ......... : 20                           Admit Phys 
.................... : CESAR NICOLE Date of Birth ....... : 2000            
           Family Phys ................... : UNKNOWN CO Phone ..................
: [phone]                   Age ................................ : 20 Film#
.................. .:[id number]                         Sex 
................................. : M  Unsigned transcriptions are preliminary 
reports and do not represent a medical or legal document          CT HEAD W/O 
CONTRAST         52180    COMPLETE:20 16:19 TRENT 10663                     
  Reason(s): syncope with fall; contusion to post     CT SCAN OF THE HEAD 
WITHOUT CONTRAST:  INDICATION: Syncope with fall, contusion to post.  FINDINGS: 
No intra-axial or extra-axial collections of fluid. Ventricles and sulci 
unremarkable. No midline shift or mass effect.  Visualized paranasal sinuses and
mastoid air cells unremarkable.  IMPRESSION: No acute intracranial process.  
While performing the above CT examination, radiation dose reduction was 
accomplished utilizing automated exposure control, adjusting of the mA and kV 
based on the patient's body size and/or the use of imperative reconstructive julito
hniques.  CT dose: 902.2 mGycm  Examination dictated by ILA Sanchez. 
Examination was reviewed with Jc Flynn MD, radiologist at the time of 
this dictation.   ___________________________________ Electronically Reviewed 
and Signed By JC FLYNN MD                            , 20 09:27, 
[facility]  Transcribe Initials: KELL , Transcribe Date: 20 01:09, Dictation Date: 
 Copy for: NEL TAPIA                 via fax Copy for: EMERGENCY DEPT 
                via modem Copy for: [facility]                                 
                                                                Page 1 of 2 
[facility] [address] [place] [address] PHONE: [phone] 
FAX: [phone]  Name .................. : JIM SOTO J           Acct 
Number.................. : [id number] ROOM. ................. : TR-05             
          MR Number ................... : [id number] Stay type ............. : E/R  
                       Discharge Date......... ... : 20 Admit Date 
......... : 20                        Admit Phys .................... : 
CESAR NICOLE Date of Birth ....... : 2000                     Family Phys 
................... : UNKNOWN CO Phone .................. : [phone]        
       Age ................................ : 20 Film# .................. 
.:[id number]                      Sex ................................. : M  Unsign
ed transcriptions are preliminary reports and do not represent a medical or 
legal document          CT HEAD W/O CONTRAST         97045    COMPLETE:20 
16:19 TRENT 22570                        Reason(s): syncope with fall; contusion 
to post   DISCHARGED                                                            
                                     Page 2 of 2   









          Name      Value     Range     Interpretation Code Description Data Anabella

rce(s) Supporting 

Document(s)

 

                                                                       









                    ID                  Date                Data Source

 

                    [id number]     2020 09:09:00 AM EDT [facility] [address] [place] [address] PHONE: [phone]

FAX: [phone]  Name .................. : JIM ZAPATA             Acct
Number.................. : [id number] ROOM. ................. : -05             
            MR Number ................... : [id number] Stay type ............. : E/R
                           Discharge Date......... ... : Admit Date ......... : 
20                          Admit Phys .................... : TURRIN COREY 
Date of Birth ....... : 2000                       Family Phys 
................... : UNKNOWN CO Phone .................. : [phone]        
         Age ................................ : 20 Film# .................. 
.:[id number]                        Sex ................................. : M  
Unsigned transcriptions are preliminary reports and do not represent a medical 
or legal document            CHEST PORTABLE                 12407   
COMPLETE:20 11:30 [facility] [address]84                                           Ronna
son(s): syncope     CHEST PORTABLE, 20:  HISTORY: Syncope.  FINDINGS: The 
cardiac and mediastinal silhouettes appear normal and the lungs are clear. The 
bones and soft tissues are normal. The upper abdomen is unremarkable.  
IMPRESSION: No acute disease identifiable.    
___________________________________ Electronically Reviewed and Signed By Ryaln Kearney MD                 , 20 09:09, [facility]  Transcribe Initials: DEEP, 
Transcribe Date: 20 13:01, Dictation Date:   Copy for: NEL TAPIA
                via fax Copy for: EMERGENCY DEPT                  via modem Copy
for: [facility] DISCHARGED                                                     
                                              Page 1 of 1   









          Name      Value     Range     Interpretation Code Description Data Anabella

rce(s) Supporting 

Document(s)

 

                                                                       









                    ID                  Date                Data Source

 

                    [id number]     2020 09:08:00 PM EDT [facility] [address] [place] [address] PHONE: [phone]

FAX: [phone]  Name ..............: JIM ZAPATA Acct Number 
...........................: [id number] ROOM. ............: TR-05               
Number ............................: [id number] Stay type.........: E/R             
   Discharge Date...............:20 Admit Date .....: 20            
 Admit Phys .............................: CESAR NICOLE Date of Birth ..: 
2000            Family Phys ...........................: UNKNOWN CO 
Phone..............: [phone]       Age.................................:20 
Film# ...............:[id number]            Sex.................................:M  
Unsigned transcriptions are preliminary reports and do not represent a medical 
or legal document               [facility]                 51097    COMPLETE:20 
14:56  49589               [facility]                 [address]101    COMPLETE:20 
14:56 WL [id number]               EKG                 69760    COMPLETE:20 
14:58 WL 86930     Please See Scanned Results.   









          Name      Value     Range     Interpretation Code Description Data Anabella

rce(s) Supporting 

Document(s)

 

                                                                       









                    ID                  Date                Data Source

 

                    [id number]      2020 10:02:00 AM EDT [facility]

 

                                                                                

                                        

           1                                         OrderSheet                 
                    [facility]                                      
Emergency Department                              [address] [place] [address]                                Phone #: [phone]          
                              2020 10:02                      
----------------------------------------------------                            
     Patient: SOTO FERNANDEZ                                MRN: [id number]    
 Acct#: [id number]                              Sex: M : 2000 Age: 
20yWEIGHT:68.9 kg (S) HEIGHT:74 inches (S) BMI:19.5ALLERGIES: No Known Drug 
AllergyCHIEF COMPLAINT: syncope, [id number]: SeizureLAB ORDERSOrder Description
     Priority      Entered              Acknowledged        InitialedCBC w Diff 
           STAT          10:36 2020                         10:42 Marshal Nichols 
RN                                    P.A.-C;CMP                    STAT        
 10:36 2020                         10:42 Marshal Nichols RN                       
            P.A.-C;Lipase                 STAT          10:36 2020        
                10:42 Marshal Nichols RN                                    P.A.-
C;PT/PTT                 STAT          10:36 2020                         
10:42 Marshal Nichols RN                                    P.A.-C;Troponin-T            
STAT          10:36 2020                         10:42 Marshal Nichols RN          
                         P.A.-C;Magnesium              STAT          10:36 
2020                         10:42 Marshal Nichols RN                              
     P.A.-C;D-Dimer                STAT          10:36 2020               
         10:42 Marshal Nichols RN                                    P.A.-C;TSH          
         STAT          10:36 2020                         10:42 Marshal Nichols RN 
                                  P.A.-C;Urinalysis (Clean      STAT          
10:36 2020                         12:25 Raymond)                      
       Eligio Nichols RN                        
           P.A.-C;Urine Drug Screen      STAT          10:36 2020         
               12:25 Marshal Nichols RN                                    P.A.-C;BNP   
                STAT          10:36 2020                        10:42 
Marshal Hussein                                         OrderSheet     
                                [facility]                          
           Emergency Department                              [address] [place] [address]                                Phone #: [phone]- [phone]                                         2020 10:02                   
  ----------------------------------------------------                          
       Patient: SOTO FERNANDEZ                                MRN: [id number]  
   Acct#: [id number]                              Sex: M : 2000 Age: 20y 
                                  Eligio Nichols RN                                    P.A.-C;Salicylate Level       STAT        
 10:43 2020                         10:48 Marshal Nichols RN                       
            P.A.-C;Acetaminophen          STAT          10:43 2020        
                10:48 Luis Nichols RN                                    P.A.-
C;CPK                    STAT          10:43 2020                         
10:49 Marshal Nichols RN                                    P.A.-C;DIAGNOSTIC STUDY 
ORDERSOrder Description  Priority     Entered                  Acknowledged     
   InitialedChest Portable 1   STAT         10:36 2020                    
         10:42 Shasta Nichols RN(Oxygen?(No))                  P.A.-C;                  
Reason for Study: syncopeCT Head W/O Cont   STAT         10:36 2020       
                      10:42 Marshal(Oxygen?(No))                  Eligio Nichols RN                               P.A.-C;
                 Reason for Study: syncope with fall; contustion to postCT Spine
Cervical  STAT         10:36 2020                              10:42 
MarshalW/O Cont                       Eligio Nichols RN(Oxygen?(No))                  P.A.-C;                  Reason for 
Study: syncope with fallMEDICATION/IV/DRIP/FLUID ORDERSOrder Description    
Priority   Entered                   Acknowledged        InitialedIV NS : Bolus 
1000              10:36 2020                              10:46 PetermL, 
then 100 mL/hr             Eligio Nichols RN                               P.A.-C;Zofran IVP 4 mg                 10:36 
2020                              10:46 Marshal Nichols RN                         
     P.A.-C;Ativan IVP 2 mg                 11:07 2020                    
         11:10 Susana Fermin(HIGH ALERT                    Susana Fermin RN;        
                      RNMEDICATION,                    Verbal order per;NOW)    
                      Jb Dominguez M.D.Ativan 
IVP 2 mg                 11:08 2020                              11:10 
Susana Fermin(HIGH ALERT                    Susana Fermin RN;                       
       RNMEDICATION,                    Verbal order per;                       
                                                                    3           
                            OrderSheet                                     
[facility]                                     Emergency Department 
                           [address] [place] [address]                 
             Phone #: [phone]- [phone]                                  
     2020 10:02                     
----------------------------------------------------                            
    Patient: SOTO FERNANDEZ                               MRN: [id number]      
Acct#: [id number]                             Sex: M : 2000 Age: 20yNOW) 
                            Jb Dominguez M.D.Keppra 1000 mg                     11:12 2020                         
11:25 PeterIVPB X1 dose: 1000                Eligio Nichols RNmg with Dextrose                  P.A.-C;Intravenous 100 
mL(D5W)GENERAL ORDERSOrder Description     Priority     Entered               
Acknowledged        InitialedBlood Pressure                     10:36 2020
                         10:42 Leti Nichols RN                              
   P.A.-C;Cardiac Monitor                    10:36 2020                   
      10:42 Marshal(continuous)                      Eligio Nichols RN                                  P.A.-C;EKG                
               10:36 2020                          10:42 Marshal Nichols RN         
                        P.A.-C;NPO                                10:36 
020                          10:42 Marshal Nichols RN                              
   P.A.-C;Obtain Old EKG                     10:36 2020                   
      10:42 Marshal Nichols RN                                  P.A.-C;Obtain Old Records 
               10:36 2020                          10:42 Marshal Nichols RN         
                        P.A.-C;Pulse oximeter                     10:36 
2020                          10:42 Marshal(Continuous)                     
Eligio Nichols RN                              
   P.A.-C;Saline Lock                        10:36 2020                   
      10:42 Marshal CATES.A.-C;Vitals             
               10:36 2020                          10:42 Marshal CATES.A.-C;EKG                                11:56 
2020                          11:56 Marshal Nichols RN;                           Simone GERONIMO                              
   Verbal order per (                                  Verbal order read        
                         back and verified );                                  
Eligio Wall                                            OrderSheet                
                      [facility]                                    
  Emergency Department                               [address] [place] [address]                                 Phone #: [phone]- [phone]      
                                   2020 10:02                       
----------------------------------------------------                            
      Patient: SOTO FERNANDEZ                                 MRN: [id number]  
   Acct#: [id number]                               Sex: M : 2000 Age: 20y
                                     Nel DAVIDSON[Electronically signed by 
Marshal Nichols RN (14:31 2020)][Electronically signed by Eligio Marlow P.A.-C (19:20 2020)][Electronically locked by Marshal Nicohls RN 
(14:2020)]  









          Name      Value     Range     Interpretation Code Description Data Anabella

rce(s) Supporting 

Document(s)

 

                                                                       









                    ID                  Date                Data Source

 

                    [id number]      2020 10:02:00 AM EDT [facility]

 

                                                                                

                                        

         1                            Medication Reconciliation Report          
                          [facility]                                
    Emergency Department                             [address] [place] [address]                               Phone #: [phone]- [phone]        
                               2020 10:02                     
----------------------------------------------------                            
    Patient: SOTO FERNANDEZ                               MRN: [id number]      
Acct#: [id number]                             Sex: M : 2000 Age: 
20yWeight:      68.9 kgHeight/Length:      74 in.BMI:         19.5ALLERGIES: No 
Known Drug AllergyThe patient's Home Medications are listed below:NONE.The 
source(s) of the original Home Medication information:Not obtained.The following
Medications were given to the patient in the Emergency Department:IV NS IV 
Fluids bolus 0, then 1000 mL/hr, administered: 2020 10:46:00 AMZofran [IVP]
 IVP 4 mg, administered: 2020 10:46:00 AMAtivan [IVP] IVP 2 mg, 
administered: 2020 11:03:00 AMAtivan [IVP] IVP 2 mg, administered: 
2020 11:05:00 AMKeppra [IVPB] IVPB bolus 0, then 1000 mg 400 mL/hr, 
administered: 2020 11:25:00 AMIV NS IV Fluids bolus 0, then 100 mL/hr, 
administered: 2020 12:20:00 PMThe following Medications were prescribed to 
the patient:None.  









          Name      Value     Range     Interpretation Code Description Data Anabella

rce(s) Supporting 

Document(s)

 

                                                                       









                    ID                  Date                Data Source

 

                    [id number]      2020 10:02:00 AM EDT [facility]

 

                                                                                

                                        

              1                            Medication Administration Record     
                                 [facility]                         
             Emergency Department                              [address] [place] [address]                                Phone #: [phone]- [phone]                                         2020 10:02                   
   ----------------------------------------------------                         
         Patient: SOTO FERNANDEZ                                MRN: [id number]
      Acct#: [id number]                              Sex: M : 2000 Age: 
20yWeight: 68.9 kgHeight/Length: 74 inBMI:    19.5ALLERGIES:     No Known Drug 
Allergy      Date/Time                  Medication Administered            
Medication OrderedStart                       IV NS                             
  IV NS : Bolus 1000 mL, then 05987:46 2020            Dose: IV Fluids    
                 mL/hrPeter JUANPABLO Nichols         Rate: 1000 mL/hr over 1 
hour(s)----                        Dispensed: 1000 mL bagStop                   
     Site: #1 left AC12:17 2020BHAVNA Ledezmatart                       
IV NS                               IV NS : Bolus 1000 mL, then 62256:20 
2020            Dose: IV Fluids                     mL/Ibeth Nichols RN  
       Rate: 100 mL/hr over 9 hour(s)----                        Dispensed: 1000
 mL bagStop                        Site: #1 left AC14:31 2020Marshal Nichols RNGiven                       ZOFRAN [IVP] (ONDANSETRON HCL)      Zofran IVP 4 
mg10:46 2020            Dose: 4 mg IVPPerika Nichols RN         Site: #1 
left ACGiven                       ATIVAN [IVP] (LORAZEPAM)            Ativan 
IVP 2 mg (HIGH ALERT11:03 2020            Dose: 2 mg IVP                  
    MEDICATION, NOW)Susana Fermin RN         Site: #1 left ACGiven               
        ATIVAN [IVP] (LORAZEPAM)            Ativan IVP 2 mg (HIGH ALERT11:05 
2020            Dose: 2 mg IVP                      MEDICATION, NOW)Susana Fermin RN         Site: #1 left ACStart                       KEPPRA [IVPB] 
(LEVETIRACETAM)       Keppra 1000 mg IVPB X1 dose:11:25 2020            
Dose: 1000 mg IVPB                  1000 mg with DextroseMarshal Nichols RN        
 Rate: 400 mL/hr over 15 minute(s)   Intravenous 100 mL (D5W)----               
         Dispensed: 100 mL bagStop                        Site: #1 left AC11:45 
2020Marshal Nichols RN  









          Name      Value     Range     Interpretation Code Description Data Anabella

rce(s) Supporting 

Document(s)

 

                                                                       









                    ID                  Date                Data Source

 

                    [id number]      2020 10:02:00 AM EDT [facility]

 

                                                                                

                                     1  

                                    General Instructions                        
              [facility]                                      
Emergency Department                              [address] [place] [address]                                Phone #: [phone]- [phone]         
                                2020 10:02                      
----------------------------------------------------                            
      Patient: SOTO FERNANDEZ                                MRN: [id number]   
   Acct#: [id number]                              Sex: M : 2000 Age: 
20yNew onset generalized seizure. No history of epilepsy.(Electronically signed 
by Eligio Marlow P.A.-C 2020 19:20)  









          Name      Value     Range     Interpretation Code Description Data Anabella

rce(s) Supporting 

Document(s)

 

                                                                       









                    ID                  Date                Data Source

 

                    [id number]      2020 10:02:00 AM EDT [facility]

 

                                                                                

                                        

                           1                                    Clinical Report 
- Nurses                                      [facility]            
                          Emergency Department                              [address] [place] [address]                                Phone #: [phone]- [phone]                                         2020 10:02     
                 ----------------------------------------------------           
                       Patient: SOTO FERNANDEZ                             
   MRN: [id number]      Acct#: [id number]                              Sex: M : 
2000 Age: 20yTRIAGEArrived by EMS. Historian: patient.Acuity: LEVEL 
3.Chief Complaint: SINGLE SYNCOPAL EPISODE.This occurred just prior to arrival. 
This is a new problem. Does not recall events relating to the worsening.Started 
while participating in moderate exertion. Patient was witnessed to be last known
 well.Witnessed: Event was witnessed. 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
 the incident, and per witnesseshe had a syncopal episode with an unknown time 
down, per EMS pt has been lethargic, , pt voiceshe may have had the same 
situation in his younger years, pt voices abdominal pain).EMS Treatment PTA:EMS 
treatment verbally communicated and report reviewed. See report. Finger stick 
glucose performed(135).SEPSIS SCREEN: SIRS Screen negative. Sepsis Screen 
negative. No suspected or confirmed signs ofinfection present.BARAK COMA 
SCORE: 15- eyes open- spontaneous (4); best verbal response- oriented (5); 
bestmotor response- obeys commands (6). --10:18 20 Marshal Nichols, RN10:10 
20. BP: 114/75. MAP: 88. HR: 51. RR: 17. O2 saturation: 100%. Temp: 96.9 
F. Pain levelnow: 7/10. --10:18 20 Marshal Nichols RN.Weight: 68.9 kg stated. 
Height/Length: 74 inches Per Patient. BMI: 19.5. --10:09 20 Marshal Nichols RN.MedicationsNone. --10:14 20 Marshal Nichols RN.AllergiesNo Known Drug
 Allergy. --10:14 20 Marshal Nichols RN.PROBLEMS:no known problems.ADDITIONAL 
SURGERIES:Hip Surgery. --10:15 20 Marshal Nichols RN.History                  
                                                                                
               2                                     Clinical Report - Nurses   
                                    [facility]                      
                 Emergency Department                               [address] [place] [address]                                 Phone #: [phone]
[phone]                                          2020 10:02        
               ----------------------------------------------------             
                      Patient: SOTO FERNANDEZ                              
   MRN: [id number]      Acct#: [id number]                               Sex: HERMELINDO : 
2000 Age: 20y PAST MEDICAL HX: Immunizations: up-to-date. SOCIAL HX: Never
 smoker. Occasional alcohol use; consumes beer occasionally. No drug use. He was
 offered HIV testing but declined and hepatitis C testing but declined. He has 
not traveled outside the [place] Infectious disease exposure: No infectious disease
 exposure. The patient was not exposed to Coronavirus. SELF HARM ASSESSMENT: 
Self harm assessment was performed. The patient answered "no" to the question(s)
 "Have you recently felt down, depressed, or hopeless?", "Do you have thoughts 
of harming or killing yourself?", "Do you have a plan for harming or killing 
yourself?", "Have you recently had thoughts about harming or killing others?", 
"Do you have any dangerous items in your possession?", "Have you noticed less 
interest or pleasure in doing things?", "Are you here because you tried to hurt 
yourself?" and "Have you ever tried to hurt yourself before today?". ABUSE 
ASSESSMENT: No report of abuse. NUTRITIONAL RISK ASSESSMENT: The nutritional 
risk assessment revealed no deficiencies. FUNCTIONAL ASSESSMENT: Functional 
assessment: no impairments noted. LEARNING NEEDS ASSESSMENT: The learning needs 
assessment revealed no barriers. FALL RISK ASSESSMENT: Fall risk assessment 
completed. No risk factors identified. SKIN INTEGRITY ASSESSMENT: Skin integrity
 risk assessment completed. No skin integrity risk identified. --10:18 20 
Marshal Nichols RN. Interventions To treatment room. --10:18 20 Marshal Nichols RN. 10:12 2020 Site #1 started via IV in the left antecubital space with 
an 18g angiocath, with aseptic technique and good blood return; one attempt. 
Saline lock flushed with 10 mL saline (per Mayer medic). --10:20 
Marshal Nichols RN.PHYSICAL ASSESSMENTTo room via stretcher.GENERAL / NEURO / 
PSYCH: Oriented X 4. Appears in distress. Alert. Speech within normal 
limits.HEENT: No facial asymmetry noted. Pupils equal, round and reactive to 
light.RESPIRATORY: Breath sounds within normal limits. Respirations not 
labored.CVS: Cardiac rhythm: sinus bradycardia. Capillary refill less than 2 
seconds.GI / : Abdomen soft and nontender.SKIN: Skin is warm and dry. --10:23 
20 Marshal Nichols RN.NURSING PROGRESS NOTESCardiac monitor, NIBP monitor and 
pulse oximeter placed on patient; cardiac monitor- Lead II; monitor             
                                                                                
                 3                                  Clinical Report - Nurses    
                                 [facility]                         
            Emergency Department                             [address] [place] [address]                               Phone #: [phone]- [phone]                                        2020 10:02                    
 ----------------------------------------------------                           
      Patient: SOTO FERNANDEZ                               MRN: [id number]    
  Acct#: [id number]                             Sex: M : 2000 Age: 
20yalarms on. EKG time: (10:12 2020). EKG was performed by a nurse and 
shown to the EDphysician. Patient gowned. Head of bed elevated. Reassurance 
given. Call light placed in reach.Side rails up x 2. Bed placed in lowest 
position. Brakes of bed on. Patient ready for evaluation- EDphysician and PA 
notified. --10:24 20 Marshal Nichols RN10:41 20. BP: 119/79. MAP: 92. 
HR: 54. RR: 16. O2 saturation: 100%. Pain level now: 7/10.--10:42 20 Jayne Ledezma stick glucose: 144; performed by nurse; result shown to the PA. 
--10:42 20 Marshal Nichols RN10:46 2020 Started bag #1 1000 mL IV Fluids
 IV NS; at 1000 mL/hr over 1 hour(s) via site #1 via IVpump. Allergies verified 
and confirmed 5 rights. IV patency established. IV site checked: no pain, 
redness,or swelling. IV flushed thoroughly pre- and post-medication 
administration. Information reviewed withpatient including reason for taking 
this medication. Verbalizes understanding. --10:46 20 JUANPABOL Victor10:46 
2020 Zofran (Ondansetron HCl) IVP 4 mg given over 2 minute(s) via site #1.
 Allergies verifiedand confirmed 5 rights. IV patency established. IV site 
checked: no pain, redness, or swelling. IV flushedthoroughly pre- and post-
medication administration. IVP given by RN. Information reviewed with 
patientincluding reason for taking this medication. Verbalizes understanding. 
--10:47 20 Jayne Ledezma stick glucose: 162; performed by nurse; 
result shown to the PA. --11:09 20 Susana Fermin RN11:00 20. ( walked 
by pt room and noted pt to be seizing, Dr. Gregorio in room immediately andordered 
ativan 2 mg IVP stat, pt stopped seizure activity within few sec. of being in 
room, disoriented andmumbles. oxygen at 3 L NC applied along with side rail 
pads). --11:17 20 Susana Fermin RN11:03 2020 Ativan (LORazepam) IVP 2 
mg given via site #1. Allergies verified and confirmed 5rights. IV patency 
established. IV site checked: no pain, redness, or swelling. IV flushed 
thoroughly pre-and post-medication administration. IVP given by RN. Information 
reviewed including sedative warning.--11:10 6/17/20 Susana Fermin RN11:05 
2020 Ativan (LORazepam) IVP 2 mg given via site #1. IV patency 
established. IV sitechecked: no pain, redness, or swelling. IV flushed 
thoroughly pre- and post-medication administration. IVPgiven by RN. Information 
reviewed. Verbalizes understanding. --11:10 6/17/20 Susana Fermin RNEKG time: 
(11:21 2020). EKG was performed by a tech and shown to the PA. --11:24 
20[facility] TechConsuelo ER Hixf417:25 2020 Started 1000 mg of 
Keppra (levETIRAcetam) IVPB in bag #1 100 mL; at 400 mL/hr over15 minute(s) via 
site #1. via IV pump. Allergies verified and confirmed 5 rights. IV patency 
established. IVsite checked: no pain, redness, or swelling. IV flushed thorough
ly pre- and post-medication administration.Information reviewed with patient 
including reason for taking this medication. Verbalizes understanding.--11:20 Marshal Nichols RN                                                         
                                                        4                       
             Clinical Report - Nurses                                       
[facility]                                       Emergency 
Department                               [address] [place] [address]   
                              Phone #: [phone]- [phone]                 
                         2020 10:02                       
----------------------------------------------------                            
       Patient: SOTO FERNANDEZ                                 MRN: [id number] 
     Acct#: [id number]                               Sex: M : 2000 Age: 
20y Patient transported to CT by stretcher with nurse and radiology tech. 
--11:26 20 Marshal Nichols RN Patient returned from CT by stretcher with nurse
 and radiology tech. --11:41 20 Marshal Nichols RN 11:45 2020 Keppra 
IVPB via IV site #1 Discontinued: infused. Total amount infused: 100 mL. IV 
patency established. IV site checked: no pain, redness, or swelling. IV flushed 
thoroughly. --11:45 20 Marshal Nichols RN ( pt quite lethargic after returning
 from CT, he is able to answer questions and remember nurses name). --11:49 
20 Marshal Nichols RN 11:45 20. BP: 122/49. MAP: 73. HR: 104. RR: 16. O2
 saturation: 100%. Pain level now: 0/10. --11:49 20 Marshal Nichols RN 11:56 
2020 Site #2 started via IV in the left forearm with an 20g angiocath, 
with aseptic technique and good blood return; one attempt. Saline lock flushed 
with 10 mL saline. --11:56 20 Marshal Nichols RN 12:17 2020 IV Fluids IV
 NS via IV site #1 Discontinued: infused. Total amount infused: 1000 mL. IV 
patency established. IV site checked: no pain, redness, or swelling. IV flushed 
thoroughly. --12:17 20 Marshal Nichols RN 12:20 2020 Started bag #2 1000
 mL IV Fluids IV NS; at 100 mL/hr over 9 hour(s) via site #1 via IV pump. 
Allergies verified and confirmed 5 rights. IV patency established. IV site 
checked: no pain, redness, or swelling. IV flushed thoroughly pre- and post-
medication administration. Information reviewed with patient including reason 
for taking this medication. Verbalizes understanding. --12:20 20 Marshal Nichols RN 12:55 20. BP: 108/79. MAP: 88. HR: 105. RR: 13. O2 saturation: 
100%. --12:56 20 Consuelo Morales ED, ER Tech1 14:31 2020 IV 
Fluids IV NS via IV site #1 Discontinued: STOPPED upon transfer. Total amount 
infused: 250 mL. IV patency established. IV site checked: no pain, redness, or 
swelling. IV flushed thoroughly. --14:31 20 Marshal Nichols RN. Intake Output 
Urine output: 400 mL with return of yellow-colored clear urine. Urine: normal 
smelling. --12:25 20 Marshal Nichols RN.DISPOSITION / DISCHARGE Transferred to
 [facility]. Visit overview and summary of care (CCDA) provided to
 transport team and EMS via paper and fax. Transported via ambulance by EMS with
 monitor, IV and O2. Report was given to a nurse via a phone call and visit 
overview. Report included information regarding                                 
                                                                            5   
                                 Clinical Report - Nurses                       
                [facility]                                       
Emergency Department                               [address] [place] [address]                                 Phone #: [phone] ext- [phone]      
                                    2020 10:02                       
----------------------------------------------------                            
       Patient: SOTO FERNANDEZ                                 MRN: [id number] 
     Acct#: [id number]                               Sex: M : 2000 Age: 
20y patient's care, treatment and condition including: recent changes, current 
and abnormal vital signs and abnormal labs. Report included treatment 
information regarding medications given or pending; type and amount of IV fluids
 and medications infusing and total volume infused. All questions were answered.
 Report was acknowledged and care was transferred. (Mary Jane GERONIMO). --13:22 20 
Marshal Nichols RN 13:20 20. BP: 101/66. MAP: 77. HR: 116. RR: 16. O2 
saturation: 100% on nasal cannula at 2 liters/minute. Temp: 97.8 F (tympanic). 
Pain level now: 0/10. --13:22 20 Marshal Nichols RN Departure time: 14:31 
2020. --14:31 20 Masrhal Nichols RN 14:30 20. BP: 104/56. MAP: 72.
 HR: 95. RR: 16. O2 saturation: 98%. Pain level now: 0/10. --14:31 20 Marshal Nichols RN.Locked/Released at 2020 14:31 by Peter Nichols, RN  









          Name      Value     Range     Interpretation Code Description Data Anabella

rce(s) Supporting 

Document(s)

 

                                                                       









                    ID                  Date                Data Source

 

                    [id number]      2020 10:02:00 AM EDT [facility]

 

                                                                                

                                        

                          1                           Clinical Report - 
Physicians/Mid Levels                                         [facility]                                         Emergency Department           
                      [address] [place] [address]                      
             Phone #: [phone] ext- [phone]                                  
          2020 10:02                         
----------------------------------------------------                            
         Patient: SOTO FERNANDEZ                                   MRN: 
[id number]      Acct#: [id number]                                 Sex: M : 
2000 Age: 20y Time Seen: 10:17 2020; initial patient contact, 
initial documentation. Arrived- By ambulance. Historian- patient and EMS 
personnel. Disposition decision: 12:58 2020.HISTORY OF PRESENT ILLNESS 
Chief Complaint: SINGLE SYNCOPAL EPISODE. This occurred just prior to arrival. 
The patient has recovered. Patient was witnessed to be last known well. Event 
was witnessed. Witnessed by co-worker. The patient lost consciousness and 
collapsed. Seizure activity observed (per co-workers/EMS). At time of event, he 
was standing. Had a single episode. Currently has nausea. (Pt provides hx, 
slightly limitied as he does not remember much. Pt does provide some. Sts that 
he did not do PT today. Went to [facility]. Conducted a [facility] charge. Sts 
that is about it for remembering; does not remember incident other than standing
 position.). Similar symptoms previously. Patient has had similar symptoms once.
 ( Pt reports one episode of seizure activity when child (<1yo)). Recent medical
 care: Not recently seen/assessed.REVIEW OF SYSTEMSNo headache, dizziness, 
weakness, chest pain or palpitations. No abdominal pain, diarrhea, black 
stools,numbness or bloody stools. No fever, sore throat, difficulty with 
urination, skin rash or enlarged lymphnodes. No cough or joint pain. The patient
 has had vomiting. All other systems reviewed and arenegative.PAST HISTORYSee 
nurses notes. Problems: no known problems. Additional Surgeries: Hip Surgery. 
Medications: None. Allergies: No Known Drug Allergy.SOCIAL HISTORYNever smoker. 
Occasional alcohol use. No drug use.                                            
                                                                2               
           Clinical Report - Physicians/Mid Levels                              
         [facility]                                       Emergency 
Department                               [address] [place] [address]   
                              Phone #: [phone] ext- [phone]                 
                         2020 10:02                       
----------------------------------------------------                            
       Patient: SOTO FERNANDEZ                                 MRN: [id number] 
     Acct#: [id number]                               Sex: M : 2000 Age: 
20yADDITIONAL NOTESThe nursing notes have been reviewed.PHYSICAL EXAMVital 
Signs: 2020 10:41 BP: 119/79. MAP: 92. HR: 54. RR: 16. O2 saturation: 100
%. Pain level now:7/10.2020 10:10 BP: 114/75. MAP: 88. HR: 51. RR: 17. O2 
saturation: 100%. Temp: 96.9 F. Pain levelnow: 710. Have been reviewed. Oxygen 
saturation normal.Appearance: No acute distress.Eyes: Pupils equal, round and 
reactive to light.ENT: Normal ENT inspection. Airway intact. TM's normal. Ears 
normal. Nose normal. Nares normal.Pharynx normal. Moist mucous membranes. Uvula 
midline. Voice normal.Neck: Normal inspection. Neck supple.CVS: Bradycardia. 
Normal heart rhythm. No JVD present. Pulses normal. Capillary refill 
normal.Strong peripheral pulses. Heart sounds normal. Pulses: right radial 2+; 
left radial 2+; right dorsalis pedis2+; left dorsalis pedis 2+; right posterior 
tibial 2+; left posterior tibial 2+.Respiratory: Chest normal on inspection. No 
respiratory distress. Unlabored respirations. Lungs clear.Good chest movement. 
Breath sounds normal and equal. Chest nontender.Abdomen: Normal inspection. Soft
 and nontender. Bowel sounds normal. No distention.Extremities: Extremities 
exhibit normal ROM. No lower extremity edema. No calf tenderness. No 
lowerextremity edema.Neuro: Alert. Oriented X 3. Mood/affect normal. Speech 
normal. Cranial nerves II through XII intactand normal (as tested). No 
cerebellar findings. No abnormal finger-nose test. No motor deficit. Nosensory 
deficit.Psych: Cognition normal. Thought process and content normal. Insight and
 judgement normal.LABS, X-RAYS, AND EKGEKG: Bradycardia (50). Sinus Rey; RAD; 
early repolarization; borderline ECG. Discussed and reviewed with/by attendign. 
EKG #2: Rate: 157. Afib w/ rapid vent response w/ premature aberrajntly 
conducted complexes; RAD; nonspecific abnormality, probably gitalis effect; 
abnormal ECG. Discussed and reviewed with attending. EKG #3: Normal sinus 
rhythm. Rate: 96. NSR; RAD; borderline ECG. Discussed and reviewed with/by 
attending. Chest X-ray: (Christel quiñonez Ryan - 2020 10:56:45 AM nad). 
The X-rays were interpreted by the radiologist. CT C-Spine: (Gee barrera Michael - 2020 11:46:34 AM nad, losss of lordosis, could be spasm). The 
study was interpreted by the radiologist. CT Head: (Gee barrera Michael -
 2020 11:42:12 AM No acute disease). The study was interpreted by the 
radiologist.                                                                    
                                       3                         Clinical Report
 - Physicians/Mid Levels                                      [facility]                                      Emergency Department              
                [address] [place] [address]                            
    Phone #: [phone] ext- [phone]                                         
2020 10:02                      
----------------------------------------------------                            
      Patient: SOTO FERNANDEZ                                MRN: [id number]   
   Acct#: [id number]                              Sex: M : 2000 Age: 
20yLaboratory Tests:Salicylate Level:      (LINSEY: 2020 10:44)            (
 MsgRcvd 2020 11:27) Final results **Test**                               
    **Result**      **Flag**    **Units**      **(Reference)** SALICYLATE       
                          <0.3            L           mg/dL           (2.0 - 
20.0)Acetaminophen Level:     (LINSEY: 2020 10:44)          ( [facility] 
2020 11:17) Final results **Test**                                   
**Result**      **Flag**    **Units**      **(Reference)** ACETAMINOPHEN        
                      <5.0                        UG/ML           (0.0 - 
30.0)CPK:   (LINSEY: 2020 10:44)             ( [facility] 2020 11:28) 
Final results **Test**                                   **Result**      
**Flag**    **Units**      **(Reference)** CPK                                  
      333             H           U/L             (30 - 170)CBC w Diff:      
(LINSEY: 2020 10:44)           ( [facility] 2020 11:07) Final results 
**Test**                                   **Result**      **Flag**    **Units**
      **(Reference)** CBC W/AUTOMATED DIFF     COMPLETE BLOOD COUNT WBC         
                               13.8            H           10/uL         (4.2 - 
11.0) RBC                                        4.69                        
10/uL         (4.50 - 6.30) HEMOGLOBIN                                 13.8     
       L           g/dL            (14.0 - 16.0) HEMATOCRIT                     
            42.2                        %               (41.0 - 51.0) MCV       
                                 90.0                        fL              
(80.0 - 94.0) MCH                                        29.4                   
     pg              (27.0 - 34.0) MCHC                                       
32.7                        g/dL            (31.0 - 36.0) RDW                   
                     13.6                        %               (11.5 - 14.8) 
PLATELETS                                  219                         10/uL    
     (150 - 450) MPV                                        11.3            H   
        fL              (7.4 - 10.4) NEUT                                       
85.0            H           %               (37.0 - 80.0) LYMPH                 
                     8.6             L           %               (25.0 - 40.0) 
MONO                                       5.7                         %        
       (3.0 - 8.0) EOS                                        0.1               
          %               (0.0 - 7.0) BASO                                      
 0.1                         %               (0.0 - 2.0) %IG                    
                    0.5             H           %               (0.0 - 0.0) 
%NRBC                                      0.0                         %        
       (0.0 - 0.0) #NEUT                                      11.74           H 
          10/uL         (2.00 - 6.90) #LYMPH                                    
 1.19                        10/uL         (0.60 - 3.40) #MONO                  
                    0.79                        10/uL         (0.00 - 0.90) #EOS
                                       0.02                        10/uL        
 (0.00 - 0.70) #BASO                                      0.02                  
      10/uL         (0.00 - 0.20) #IG                                        
0.07                        10/uL         (0.00 - 0.10) #NRBC                   
                   0.00                        10/uL         (0.00 - 0.00) 
MANUAL DIFF                                NOT INDICATED RBC MORPH              
                    NOT INDICATEDCMP:   (LINSEY: 2020 10:44)             ( 
MsgRcvd 2020 11:27) Final results **Test**                                
   **Result**      **Flag**    **Units**      **(Reference)** COMPREHENSIVE 
METABOLIC PANEL     COMPREHENSIVE METABOLIC PANEL SODIUM                        
             137                         mEq/L           (134 - 153) POTASSIUM  
                                3.6                         mEq/L           (3.6
 - 5.0) CHLORIDE                                   100                         
mEq/L           (98 - 107) CO2                                        24        
                  MEQ/L           (22 - 30) GLUCOSE                             
       196             H           MG/DL           (65 - 110) BUN               
                         21                          MG/DL           (7 - 21)   
                                                                                
                             4                         Clinical Report - 
Physicians/Mid Levels                                      [facility]                                      Emergency Department              
                [address] [place] [address]                            
    Phone #: [phone] ext- [phone]                                         
2020 10:02                      
----------------------------------------------------                            
      Patient: SOTO FERNANDEZ                                MRN: [id number]   
   Acct#: [id number]                              Sex: M : 2000 Age: 20y 
CREATININE                                1.0                          MG/DL    
         (0.7 - 1.5) BUN/CREAT                                 21               
                              (8 - 27) TOTAL PROTEIN                            
 6.8                          G/DL              (6.3 - 8.2) ALBUMIN             
                      4.8                          G/DL              (3.9 - 5.0)
 GLOBULIN                                  2.0              L           GM/DL   
          (2.4 - 3.2) A/G RATIO                                 2.4             
 H                             (0.8 - 2.0) CALCIUM                              
     9.2                          MG/DL             (8.4 - 10.2) TOTAL BILI     
                           0.7                          MG/DL             (0.2 -
 1.3) ALKALINE PHOS                             68                           U/L
               (38 - 126) SGOT/AST                                  31          
                 U/L               (5 - 40) SGPT/ALT                            
      24                           U/L               (7 - 56) ANION GAP         
                        13.0                         mmol/L            (8.0 - 
16.0) AGE                                       20                           yrs
 NON-AA GFR                                >60                          mL/min 
AFR AMER GFR                              >60                          mL/min   
                             Male GFR Interprentation                   20-49 
yrs       >60 mL/min   [id number] yrs     >56 mL/min   Normal                  
 60-69 yrs     >49 mL/min    Normal                   70-79yrs>42 mL/min   
Normal                   80 and above >35 mL/min     Normal                     
 Female GFRInterpretation                   20-39 yrs      >60 mL/min   Normal  
                  40-49 yrs     >58 mL/minNormal                   50-59 yrs    
 >51 mL/min    Normal                   60-69 yrs      >45 mL/min   [id number] 
yrs     >39 mL/min    Normal                   80 and above >32 mL/min     
NormalLipase:      (LINSEY: 2020 10:44)              ( [facility] 2020 
11:17) Final results **Test**                                  **Result**       
**Flag**     **Units**      **(Reference)** LIPASE                              
      16                            U/L             (13 - 60)PT/PTT:      (LINSEY:
 2020 10:44)              ( [facility] 2020 11:07) Final results 
**Test**                                   **Result**       **Flag**    
**Units**       **(Reference)** PROTIME                                    15.3 
                        SECONDS          (11.0 - 15.5) INR                      
                  1.19                                          (0.93 - 1.23) 
PTT                                        28.1                         SECONDS 
         (24.8 - 36.7)                                 \BLDo\INR 
INTERPRETATION\BLDx\            Therapeutic range for Coumadin andrelated oral 
anticoagulants.            -International Normalized Ratio (INR): 2.0 - 3.0 for 
VenousThrombosis, Pulmonary Embolus, Tissue heart valves, Acute MI           
Atrial Fibrillation, Valvular heart diseaseand recurrent            Systemic 
Embolism.            -International Normalized Ratio (INR): 2.5 - 3.5 
forMechanical            Prosthetic valve.Troponin-T:     (LINSEY: 2020 
10:44)           ( [facility] 2020 11:13) Final results **Test**             
                   **Result**      **Flag**    **Units**          
**(Reference)** TROPONIN T                              <0.01                   
    NG/ML               (0.00 - 0.10) TROPONIN T0.1 ng/ml Recommended as the 
clinical threshold value forTroponin T.Magnesium:     (LINSEY: 2020 10:44)  
          ( [facility] 2020 11:17) Final results **Test**                    
              **Result**       **Flag**     **Units**      **(Reference)** 
MAGNESIUM                                 2.0                           MG/DL   
        (1.7 - 2.2)D-Dimer:      (LINSEY: 2020 10:44)             ( [facility] 
2020 11:07) Final results **Test**                                  
**Result**       **Flag**     **Units**      **(Reference)** D-DIMER QUANT      
                       0.33                          ug/mL           (0.27 - 
0.50)TSH:      (LINSEY: 2020 10:44)         ( [facility] 2020 11:28) 
Final results **Test**                                  **Result**       
**Flag**     **Units**      **(Reference)** TSH                                 
      0.65                          uIU/mL          (0.47 - 5.01)               
                                                                                
                     5                             Clinical Report - 
Physicians/Mid Levels                                           [facility]                                           Emergency Department         
                          [address] [place] [address]                  
                   Phone #: [phone]- [phone]                            
                  2020 10:02                           
----------------------------------------------------                            
           Patient: SOTO FERNANDEZ                                     MRN:
 [id number]      Acct#: [id number]                                   Sex: M : 
2000 Age: 20y  Urinalysis:    (LINSEY: 2020 12:25)             ( 
[facility] 2020 12:37) Final results     **Test**                            
         **Result**       **Flag**   **Units**      **(Reference)**     
URINALYSIS         URINALYSIS     SOURCE                                       R
      COLOR                                       yellow                        
              (NORMAL: Yello      CLARITY                                     
clear                                       (NORMAL: Clear      SPEC GRAVITY    
                            1.020                                       (1.001 -
 1.030      pH                                          5                       
                    (5 - 9)      GLUCOSE                                     100
              A                          (NORMAL: Negat      BILIRUBIN          
                         NEG                                         (NORMAL: 
Negat      KETONE                                      5                A       
                   (NORMAL: Negat      PROTEIN                                  
   30                                          (NORMAL: Negat      NITRITE      
                               NEG                                         
(NORMAL: Negat      BLOOD                                       250             
 A                          (NORMAL: Negat      LEUK EST                        
            NEG                                         (NORMAL: Negat      
UROBILINOGEN                                NOR                                 
        (less than 1.0      MICROSCOPIC                                 See 
Below      RBC                                         15 - 20          A       
                   (NORMAL: NONE      EPITHELIAL                                
  FEW                                         (NORMAL: NONE      MUCOUS         
                             Trace                                       
(NORMAL: NONE  BNP:    (LINSEY: 2020 10:44)           ( [facility] 
11:28) Final results     **Test**                                     **Result**
       **Flag**   **Units**      **(Reference)**     BNP                        
                  33                          PG/ML           (0 - 125)  Chest 
Portable 1 View:     (LINSEY: 2020 10:36)                  ( [facility] [facility] 
2020 11:30) In Progress  CHEST PORTABLE  Reason(s): syncope  TRANSPORTATI
ON: WC                        IV?         O2?    Oxygen?(No)    Room: 
ED.PROGRESS AND PROCEDURESCourse of Care: NAD, AOx3, interacting well and 
appropriately, no use of accessory muscle, able tospeak full sentences, stable, 
non-toxic looking.  Enter room and pt lying peacefully in bed in NAD. Patient 
stable. Denies any new issues, concerns, or  complaints.  PE demos NV itnact b/l
 UE and LE. No neuro deficits. Pt is alert, but ? post ictal. Pt is able to 
answer  questions appropriatley. Hx given by medic is that there was seizure 
like activity s/p fall. Will obtian labs  and imaging for furhter eval. Pending 
reuslt.   Informed by nurse and attending; pt is having a active seizure. 
Attenidng ordered 2mg ativan. Potential  second dose. Discuss with attending and
 indicaes to order keppra here in ER. 1000mg IV  11:28 20. Reviewed 
montior; noted afib; obtian ECG. Disucssed and rviewed with attending; sts      
                                                                                
                            6                          Clinical Report - 
Physicians/Mid Levels                                       [facility]                                       Emergency Department             
                  [address] [place] [address]                          
       Phone #: [phone] ext- [phone]                                        
  2020 10:02                       
----------------------------------------------------                            
       Patient: SOTO FERNANDEZ                                 MRN: [id number] 
     Acct#: [id number]                               Sex: M : 2000 Age: 
20y arrhythemias could be present up to 15mins s/p seizure. Indicates to 
continue to monitor. 12:03 20. Enter room adn pt lying peacefully in bed. 
He is AOx3, but does appear slighly post-ictal. Penidng resutls. 12:58 20.
 Contacted [facility] and discussed with You Nurse Supervisor; takes info and iwll 
call back; calls back and sts that Dr. Stack (hosptialist) requests Neuro 
consult as this is first seizure. Called [facility] and discusss with Dr. Staples [sp] 
and indcates that hosptialist will be admitting and will do EEG and MRI in the 
AM. continue wiht Keppra 500mg BID. Call You and inform. Sts will call back. 
Informed that pt is accepted and has room assignment. Critical care performed 
(60 minutes). Time includes: direct patient care, patient reassessment, 
coordination of patient care, review of patient's medical records, medical 
consultation, family consultation regarding treatment decisions and 
documentation of patient care- see progress notes. Discussed case with health 
care provider (Cesar). Disposition: Transferred to [facility]. 
UTI (catheter associated) was not present prior to transfer. Pressure ulcer was 
not present prior to transfer. Vascular infection (catheter associated) was not 
present prior to transfer.CLINICAL IMPRESSION New onset generalized seizure. No 
history of epilepsy.(Electronically signed by Eligio Marlow P.A.-C 
2020 19:20)  









          Name      Value     Range     Interpretation Code Description Data Anabella

rce(s) Supporting 

Document(s)

 

                                                                       









                    ID                  Date                Data Source

 

                    [id number]     2020 01:14:00 PM EDT [facility]









          Name      Value     Range     Interpretation Code Description Data Anabella

rce(s) Supporting 

Document(s)

 

          DRUG SCREEN URINE                                         [facility]  

 

                                            URINE DRUG SCREEN 

 

           Amphetamine [Presence] in Urine by Screen method NEGATIVE   NORMAL: N

EGATIVE                       

[facility]                   

 

          BARBITURATES NEGATIVE  NORMAL: NEGATIVE                     [facility]  

 

          BENZO     NEGATIVE  NORMAL: NEGATIVE                     [facility]  

 

          COCAINE   NEGATIVE  NORMAL: NEGATIVE                     [facility]  

 

           Tetrahydrocannabinol [Presence] in Urine NEGATIVE   NORMAL: NEGATIVE 

                      [facility]                            

 

          OPIATES   NEGATIVE  NORMAL: NEGATIVE                     [facility]  

 

             Phencyclidine [Presence] in Urine by Screen method NEGATIVE     NOR

MAL: NEGATIVE               

                          [facility]     

 

                                                            \BLDo\URINE DRUG SCR

EEN INTERPRETATION\BLDx\                

THE CUTOFFF LEVELS FOR DETECTION ARE AS FOLLOWS:                    AMPHETAMINES
                    1000 ng/ml                    BARBITUARATES                 
   200 ng/ml                    BENZODIAZEPINES                  100 ng/ml      
              THC                               50 ng/ml                    
PHENCYCLIDINE                     25 ng/ml                    OPIATES           
               300 ng/ml                    COCAINE                          300
 ng/ml                  ALL POSITIVES ARE CONSIDERED PRESUMPTIVE POSITIVE       
          CONFIRMATION WILL BE PERFORMED AT PHYSICIAN REQUEST. 









                    ID                  Date                Data Source

 

                    [id number]     2020 12:37:00 PM EDT [facility]









          Name      Value     Range     Interpretation Code Description Data Anabella

rce(s) Supporting 

Document(s)

 

          URINALYSIS                                         [facility]  

 

                                            URINALYSIS 

 

          SOURCE    R                                       [facility]

al  

 

          COLOR     yellow    NORMAL: Yellow                     [facility]

ospital  

 

          CLARITY   clear     NORMAL: Clear                     [facility]

spital  

 

           Specific gravity of Urine by Test strip 1.020      1.001 - 1.030     

                  [facility]                                 

 

          pH        5         5 - 9                         [facility]

al  

 

           Glucose [Mass/volume] in Urine by Test strip 100        NORMAL: Negat

[facility]                            

 

           Bilirubin.total [Presence] in Urine by Test strip NEG        NORMAL: 

Negative                       

[facility]                   

 

           Ketones [Presence] in Urine by Test strip 5          NORMAL: Negative

 [facility]                                 

 

           Protein [Mass/volume] in Urine by Test strip 30         NORMAL: Negat

[facility]                            

 

           Nitrite [Presence] in Urine by Test strip NEG        NORMAL: Negative

                       [facility]                                

 

          BLOOD     250       NORMAL: Negative [facility]  

 

           Leukocyte esterase [Presence] in Urine by Test strip NEG        KLAUDIA

L: Negative                       

[facility]                   

 

           Urobilinogen [Mass/volume] in Urine by Test strip NOR        less behzad

n 1.0 mg/dL                       

[facility]                   

 

          MICROSCOPIC See Below                               [facility]  

 

           Erythrocytes [#/volume] in Urine by Test strip 15 - 20    NORMAL: NON

E SEEN [facility]                   

 

          EPITHELIAL FEW       NORMAL: NONE SEEN                     [facility]  

 

                Mucus [Presence] in Urine sediment by Light microscopy Trace    

       NORMAL: NONE SEEN  

                                        [facility]  









                    ID                  Date                Data Source

 

                    [id number]     2020 11:28:00 AM EDT [facility]









          Name      Value     Range     Interpretation Code Description Data Anabella

rce(s) Supporting 

Document(s)

 

          BNP       33 PG/ML  0 - 125                       [facility]

[facility]  









                    ID                  Date                Data Source

 

                    [id number]     2020 11:28:00 AM EDT [facility]









          Name      Value     Range     Interpretation Code Description Data Anabella

rce(s) Supporting 

Document(s)

 

                          Thyrotropin [Units/volume] in Serum or Plasma by Detec

tion limit <= 0.05 mIU/L 

0.65 uIU/mL  0.47 - 5.01                            [facility]  









                    ID                  Date                Data Source

 

                    [id number]     2020 11:27:00 AM EDT [facility]









          Name      Value     Range     Interpretation Code Description Data Anabella

rce(s) Supporting 

Document(s)

 

                Creatine kinase [Enzymatic activity/volume] in Serum or Plasma 3

33 U/L         30 - 170        

H                                       [facility]  









                    ID                  Date                Data Source

 

                    [id number]     2020 11:27:00 AM EDT [facility]









          Name      Value     Range     Interpretation Code Description Data Anabella

rce(s) Supporting 

Document(s)

 

          SALICYLATE <0.3 mg/dL 2.0 - 20.0 L                   [facility]

pital  









                    ID                  Date                Data Source

 

                    [id number]     2020 11:27:00 AM EDT [facility]      Value     Range     Interpretation Code Description Data Anabella

rce(s) Supporting 

Document(s)

 

          COMPREHENSIVE METABOLIC PANEL                                         

[facility]  

 

                                            COMPREHENSIVE METABOLIC PANEL 

 

           Sodium [Moles/volume] in Serum or Plasma 137 mEq/L  134 - 153        

                [facility]                                 

 

           Potassium [Moles/volume] in Serum or Plasma 3.6 mEq/L  3.6 - 5.0     

                   [facility]                            

 

           Chloride [Moles/volume] in Serum or Plasma 100 mEq/L  98 - 107       

                  [facility]                                 

 

           Carbon dioxide, total [Moles/volume] in Serum or Plasma 24 MEQ/L   22

 - 30                          

[facility]                   

 

           Glucose [Mass/volume] in Serum or Plasma 196 MG/DL  65 - 110   H     

                [facility]                                 

 

          BUN       21 MG/DL  7 - 21                        [facility]

al  

 

           Creatinine [Mass/volume] in Serum or Plasma 1.0 MG/DL  0.7 - 1.5     

                   [facility]                            

 

          BUN/CREAT 21        8 - 27                        [facility]

al  

 

           Protein [Mass/volume] in Serum or Plasma 6.8 G/DL   6.3 - 8.2        

                [facility]                                 

 

           Albumin [Mass/volume] in Serum or Plasma 4.8 G/DL   3.9 - 5.0        

                [facility]                                 

 

           Globulin [Mass/volume] in Serum by calculation 2.0 GM/DL  2.4 - 3.2  

L                     [facility]                            

 

          A/G RATIO 2.4       0.8 - 2.0 H                   [facility]  

 

           Calcium [Mass/volume] in Serum or Plasma 9.2 MG/DL  8.4 - 10.2       

                [facility]                                 

 

           Bilirubin.total [Mass/volume] in Serum or Plasma 0.7 MG/DL  0.2 - 1.3

                        

[facility]                   

 

                    Alkaline phosphatase [Enzymatic activity/volume] in Serum or

 Plasma 68 U/L              38 - 

126                                             [facility]  

 

                          Aspartate aminotransferase [Enzymatic activity/volume]

 in Serum or Plasma 31 U/L

             5 - 40                                 [facility]  

 

                    Alanine aminotransferase [Enzymatic activity/volume] in Seru

m or Plasma 24 U/L              7

- 56                                            [facility]  

 

           Anion gap 3 in Serum or Plasma 13.0 mmol/L 8.0 - 16.0                

       [facility]

                                         

 

          AGE       20 yrs                                  [facility]

al  

 

          NON-AA GFR >60 mL/min                               [facility]

ital  

 

          AFR AMER GFR >60 mL/min                               [facility]

spital  

 

                                                                     Male GFR In

terprentation                  20-49 yrs

    >60 mL/min   Normal                  50-59 yrs     >56 mL/min   Normal      
           60-69 yrs     >49 mL/min   Normal                  70-79yrs      >42 
mL/min   Normal                  80 and above  >35 mL/min   Normal              
     Female GFR  Interpretation                  20-39 yrs     >60 mL/min   
Normal                  40-49 yrs     >58 mL/min   Normal                  50-59
yrs     >51 mL/min   Normal                  60-69 yrs     >45 mL/min   Normal  
               70-79 yrs     >39 mL/min   Normal                  80 and above  
>32 mL/min   Normal 









                    ID                  Date                Data Source

 

                    [id number]     2020 11:17:00 AM EDT [facility]









          Name      Value     Range     Interpretation Code Description Data Anabella

rce(s) Supporting 

Document(s)

 

           Acetaminophen [Presence] in Urine <5.0 UG/ML 0.0 - 30.0              

         [facility]                                 









                    ID                  Date                Data Source

 

                    [id number]     2020 11:17:00 AM EDT [facility]









          Name      Value     Range     Interpretation Code Description Data Anabella

rce(s) Supporting 

Document(s)

 

           Magnesium [Mass/volume] in Serum or Plasma 2.0 MG/DL  1.7 - 2.2      

                  [facility]                                









                    ID                  Date                Data Source

 

                    [id number]     2020 11:17:00 AM EDT [facility]









          Name      Value     Range     Interpretation Code Description Data Anabella

rce(s) Supporting 

Document(s)

 

           Lipase [Enzymatic activity/volume] in Serum or Plasma 16 U/L     13 -

 60                          

[facility]                   









                    ID                  Date                Data Source

 

                    [id number]     2020 11:13:00 AM EDT [facility]









          Name      Value     Range     Interpretation Code Description Data Anabella

rce(s) Supporting 

Document(s)

 

          TROPONIN T <0.01 NG/ML 0.00 - 0.10                     [facility]

ospital  

 

                                        TROPONIN T0.1 ng/ml Recommended as the c

linical threshold value forTroponin T. 









                    ID                  Date                Data Source

 

                    [id number]     2020 11:07:00 AM T [facility]









          Name      Value     Range     Interpretation Code Description Data Anabella

rce(s) Supporting 

Document(s)

 

                Fibrin D-dimer FEU [Mass/volume] in Platelet poor plasma 0.33 ug

/mL      0.27 - 0.50     

                                        [facility]  









                    ID                  Date                Data Source

 

                    [id number]     2020 11:07:00 AM EDT [facility]









          Name      Value     Range     Interpretation Code Description Data Anabella

rce(s) Supporting 

Document(s)

 

          Prothrombin time (PT) 15.3 SECONDS 11.0 - 15.5                     [facility]  

 

           INR in Platelet poor plasma by Coagulation assay 1.19       0.93 - 1.

23                       [facility]                            

 

           aPTT in Blood by Coagulation assay 28.1 SECONDS 24.8 - 36.7          

             [facility]                                 

 

                                                                      \BLDo\INR 

INTERPRETATION\BLDx\          

Therapeutic range for Coumadin and related oral anticoagulants.          -
International Normalized Ratio (INR): 2.0 - 3.0 for Venous           Thrombosis,
 Pulmonary Embolus, Tissue heart valves, Acute MI           Atrial Fibrillation,
 Valvular heart disease and recurrent           Systemic Embolism.          -
International Normalized Ratio (INR): 2.5 - 3.5 for Mechanical           
Prosthetic valve. 









                    ID                  Date                Data Source

 

                    [id number]     2020 11:07:00 AM EDT [facility]









          Name      Value     Range     Interpretation Code Description Data Anabella

rce(s) Supporting 

Document(s)

 

          CBC W/AUTOMATED DIFF                                         [facility]  

 

                                            COMPLETE BLOOD COUNT 

 

           Leukocytes [#/volume] in Blood by Automated count 13.8 10^3/uL 4.2 - 

11.0 H                     

[facility]                   

 

             Erythrocytes [#/volume] in Blood by Automated count 4.69 10^6/uL 4.

50 - 6.30                

                          [facility]     

 

           Hemoglobin [Mass/volume] in Blood 13.8 g/dL  14.0 - 16.0 L           

          [facility]                                 

 

           Hematocrit [Volume Fraction] of Blood by Automated count 42.2 %     4

1.0 - 51.0                       

[facility]                   

 

                    Erythrocyte mean corpuscular volume [Entitic volume] by Auto

mated count 90.0 fL             

80.0 - 94.0                                     [facility]  

 

                          Erythrocyte mean corpuscular hemoglobin [Entitic mass]

 by Automated count 29.4 

pg           27.0 - 34.0                            [facility]  

 

                                        Erythrocyte mean corpuscular hemoglobin 

concentration [Mass/volume] by Automated

 count     32.7 g/dL  31.0 - 36.0                       [facility]  

 

             Erythrocyte distribution width [Ratio] by Automated count 13.6 %   

    11.5 - 14.8                

                          [facility]     

 

           Platelets [#/volume] in Blood by Automated count 219 10^3/uL 150 - 45

0                        

[facility]                   

 

                    Platelet mean volume [Entitic volume] in Blood by Automated 

count 11.3 fL             7.4 - 

10.4            H                               [facility]  

 

           Neutrophils/100 leukocytes in Blood by Automated count 85.0 %     37.

0 - 80.0 H                     

[facility]                   

 

           Lymphocytes/100 leukocytes in Blood by Manual count 8.6 %      25.0 -

 40.0 L                     

[facility]                   

 

           Monocytes/100 leukocytes in Blood by Automated count 5.7 %      3.0 -

 8.0                        

[facility]                   

 

           Eosinophils/100 leukocytes in Blood by Automated count 0.1 %      0.0

 - 7.0                        

[facility]                   

 

           Basophils/100 leukocytes in Blood by Automated count 0.1 %      0.0 -

 2.0                        

[facility]                   

 

          %IG       0.5 %     0.0 - 0.0 H                   [facility]

al  

 

          %NRBC     0.0 %     0.0 - 0.0                     [facility]

al  

 

             Neutrophils [#/volume] in Blood by Automated count 11.74 10^3/uL 2.

00 - 6.90  H             

                          [facility]     

 

           Lymphocytes [#/volume] in Blood by Automated count 1.19 10^3/uL 0.60 

- 3.40                       

[facility]                   

 

           Monocytes [#/volume] in Blood by Automated count 0.79 10^3/uL 0.00 - 

0.90                       

[facility]                   

 

           Eosinophils [#/volume] in Blood by Automated count 0.02 10^3/uL 0.00 

- 0.70                       

[facility]                   

 

           Basophils [#/volume] in Blood by Automated count 0.02 10^3/uL 0.00 - 

0.20                       

[facility]                   

 

          #IG       0.07 10^3/uL 0.00 - 0.10                     [facility]

ospital  

 

          #NRBC     0.00 10^3/uL 0.00 - 0.00                     [facility]

ospital  

 

          MANUAL DIFF NOT INDICATED                               [facility]  

 

          RBC MORPH NOT INDICATED                               [facility]

spital  







                                        Procedure

 

                                          



                                                                                
                                                                                
                                                                                
                                                                                
                  



Vital Signs

          



                    ID                  Date                Data Source

 

                    UNK                                      









           Name       Value      Range      Interpretation Code Description Data

 Source(s)

 

           Body mass index (BMI) [Ratio] 20.2 kg/m2                       20.2 k

g/m2 [facility] ([facility])

 

           Body weight 157.00 [lb_av]                       157.00 [lb_av] JORGE LUIS

T ([facility])

 

           Body height 74 [in_i]                        74 [in_i]  [facility] ([facility])

 

                                        6'2" 

 

           Respiratory rate 14 /min                          14 /min    [facility] (

[facility])

 

           Heart rate 74 /min                          72 /min    [facility] ([facility])

 

           Diastolic blood pressure 70 mm[Hg]                        70 mm[Hg]  

[facility] ([facility])

 

           Systolic blood pressure 120 mm[Hg]                       120 mm[Hg] HERMELINDO MALAGON ([facility])

## 2021-01-15 NOTE — CCD
Summarization Of Episode

                             Created on: 01/15/2021



SOTO FERNANDEZ

External Reference #: [id number]

: 2000

Sex: Male



Demographics





                          Address                   [address] [address] [place] [address] [address]

R

[place]  [address]

 

                          Home Phone                [phone]

 

                          Preferred Language        English

 

                          Marital Status            Single

 

                          Congregational Affiliation     NONE

 

                          Race                      Other Race

 

                          Ethnic Group              Not  or 





Author





                          Author                    [facility]

 

                          Organization              [facility]

 

                          Address                   Unknown

 

                          Phone                     Unavailable







Support





                Name            Relationship    Address         Phone

 

                    JIMSUSANA     Next Of Kin         [address] [place]  [address]                  [phone]

 

                    [facility]             Next Of Kin         [address]

[place]

[place]  [address]                    Unavailable

 

                    IKER ULRICH     Next Of Kin         -

                                        -

                          -, -  -                   [phone]







Care Team Providers





                    Care Team Member Name Role                Phone

 

                    TURRIN,  JB   Unavailable         Unavailable

 

                    TURRIN,  JB   Unavailable         Unavailable

 

                    TURRIN,  JB   Unavailable         Unavailable

 

                    TURRIN,  JB   Unavailable         Unavailable

 

                    Ali, Mohsin MD     Unavailable         Unavailable

 

                    Ali, Mohsin MD     Unavailable         Unavailable

 

                    Ali, Mohsin MD     Unavailable         Unavailable

 

                    Ali, Mohsin MD     Unavailable         Unavailable

 

                    Ali, Mohsin MD     Unavailable         Unavailable

 

                    Ali, Mohsin MD     Unavailable         Unavailable

 

                    Ali, Mohsin MD     Unavailable         Unavailable

 

                    Ali, Mohsin MD     Unavailable         Unavailable

 

                    Ali, Mohsin MD     Unavailable         Unavailable

 

                    Ali, Mohsin MD     Unavailable         Unavailable

 

                    Ali, Mohsin MD     Unavailable         Unavailable

 

                    Ali, Mohsin MD     Unavailable         Unavailable

 

                    Ali, Mohsin MD     Unavailable         Unavailable

 

                    Ali, Mohsin MD     Unavailable         Unavailable

 

                    Ali, Mohsin MD     Unavailable         Unavailable

 

                    Ali, Mohsin MD     Unavailable         Unavailable

 

                    Ali, Mohsin MD     Unavailable         Unavailable

 

                    Ali, Mohsin MD     Unavailable         Unavailable

 

                    Ali, Mohsin MD     Unavailable         Unavailable

 

                    Ali, Mohsin MD     Unavailable         Unavailable

 

                    Ali, Mohsin MD     Unavailable         Unavailable

 

                    Ali, Mohsin MD     Unavailable         Unavailable

 

                    Ali, Mohsin MD     Unavailable         Unavailable

 

                    Ali, Mohsin MD     Unavailable         Unavailable

 

                    Ali, Mohsin MD     Unavailable         Unavailable

 

                    Ali, Mohsin MD     Unavailable         Unavailable

 

                    Ali, Mohsin MD     Unavailable         Unavailable

 

                    Ali, Mohsin MD     Unavailable         Unavailable

 

                    Ali, Mohsin MD     Unavailable         Unavailable

 

                    Ali, Mohsin MD     Unavailable         Unavailable

 

                    Ali, Mohsin MD     Unavailable         Unavailable

 

                    Ali, Mohsin MD     Unavailable         Unavailable

 

                    Ali, Mohsin MD     Unavailable         Unavailable

 

                    Ali, Mohsin MD     Unavailable         Unavailable

 

                    Ali, Mohsin MD     Unavailable         Unavailable

 

                    Ali, Mohsin MD     Unavailable         Unavailable

 

                    Ali, Mohsin MD     Unavailable         Unavailable

 

                    Ali, Mohsin MD     Unavailable         Unavailable

 

                    Ali, Mohsin MD     Unavailable         Unavailable

 

                    Ali, Mohsin MD     Unavailable         Unavailable

 

                    Ali,  Mohsin MD     Unavailable         Unavailable

 

                    Ali,  Mohsin MD     Unavailable         Unavailable

 

                    Ali,  Mohsin MD     Unavailable         Unavailable

 

                    Ali,  Mohsin MD     Unavailable         Unavailable

 

                    Ali,  Mohsin MD     Unavailable         Unavailable

 

                    Ali,  Mohsin MD     Unavailable         Unavailable

 

                    Ali,  Mohsin MD     Unavailable         Unavailable

 

                    Ali,  Mohsin MD     Unavailable         Unavailable

 

                    Ali,  Mohsin MD     Unavailable         Unavailable

 

                    Ali,  Mohsin MD     Unavailable         Unavailable

 

                    Ali,  Mohsin MD     Unavailable         Unavailable

 

                    Ali,  Mohsin MD     Unavailable         Unavailable



                                  



Re-disclosure Warning

          The records that you are about to access may contain information from 
federally-assisted alcohol or drug abuse programs. If such information is 
present, then the following federally mandated warning applies: This information
has been disclosed to you from records protected by federal confidentiality 
rules (42 CFR part 2). The federal rules prohibit you from making any further 
disclosure of this information unless further disclosure is expressly permitted 
by the written consent of the person to whom it pertains or as otherwise 
permitted by 42 CFR part 2. A general authorization for the release of medical 
or other information is NOT sufficient for this purpose. The Federal rules 
restrict any use of the information to criminally investigate or prosecute any 
alcohol or drug abuse patient.The records that you are about to access may 
contain highly sensitive health information, the redisclosure of which is 
protected by Article 27-F of the [facility] Public Health law. If you 
continue you may have access to information: Regarding HIV / AIDS; Provided by 
facilities licensed or operated by the [facility] Office of Mental Health; 
or Provided by the [facility] Office for People With Developmental 
Disabilities. If such information is present, then the following [place] 
mandated warning applies: This information has been disclosed to you from 
confidential records which are protected by state law. State law prohibits you 
from making any further disclosure of this information without the specific 
written consent of the person to whom it pertains, or as otherwise permitted by 
law. Any unauthorized further disclosure in violation of state law may result in
a fine or penitentiary sentence or both. A general authorization for the release of 
medical or other information is NOT sufficient authorization for further disc
losure.                                                                         
    



Encounters

          



           Encounter  Providers  Location   Date       Indications Data Source(s

)

 

                Outpatient      Attender: Mohsin Ali MD Main office [facility] 

2020 10:30:00 

AM EDT                                              [facility] ([facility])

 

                Outpatient      Attender: Mohsin Ali MD Main office - [place] 

2020 02:30:00 

PM EDT                                              [facility] ([facility]

[facility]

 

                Emergency       Attender: JB MOONNUZHAT                 2020 10:02:00 AM EDT - 2020 

02:31:00 PM EDT                                     [facility]

 

                                        Patient discharged. 



                                                                                
                           



Medications

          



          Medication Brand Name Start Date Product Form Dose      Route     Admi

nistrative 

Instructions Pharmacy Instructions Status     Indications Reaction   Description

 Data 

Source(s)

 

     No Active Medications      2020 12:00:00 AM EDT                      

    active                [facility]

([facility])

 

           Levetiracetam 500 MG Oral Tablet Levetiracetam 2020 12:00:00 AM

 EDT                       

ORAL                          completed                               [facility]



                                                                              



Insurance Providers

          



             Payer name   Policy type / Coverage type Policy ID    Covered party

 ID Covered 

party's relationship to arreguin Policy Arreguin             Plan Information

 

          [employer]           [id number]           SP             

     [id number]

 

          [employer] - O/P           [id number]           [id number]            

      [id number]



                                                                                
                 



Problems, Conditions, and Diagnoses

          



           Code       Display Name Description Problem Type Effective Dates Data

 Source(s)

 

             [id number]    Isolated seizures Isolated seizures Problem       12:00:00 AM EDT

                                        [facility] ([facility])

 

                46220063        Generalized convulsive epilepsy Generalized conv

ulsive epilepsy Problem

                          2020 12:00:00 AM EDT [facility] ([facility]

[facility], )

 

             R569         Unspecified convulsions Unspecified convulsions Diagno

sis    2020 

10:02:00 AM EDT                         [facility]

 

             R55          Syncope and collapse Syncope and collapse Diagnosis   

 2020 10:02:00 AM 

EDT                                     [facility]



                                                                                
                                               



Surgeries/Procedures

          



             Procedure    Description  Date         Indications  Data Source(s)

 

             EEG Complete STD Phys/QHP>36 HR<60 HR W/O Video              2020 12:00:00 AM EDT              

[facility] ([facility])

 

             EEG Complete STD Phys/QHP>36 HR<60 HR W/O Video              2020 12:00:00 AM EDT              

[facility] ([facility])

 

             EEG Complete STD Phys/QHP>36 HR<60 HR W/O Video              2020 12:00:00 AM EDT              

[facility] ([facility])

 

             EEG Complete STD Phys/QHP>36 HR<60 HR W/O Video              2020 12:00:00 AM EDT              

[employer] ([facility], )



                                                                                
                                     



Results

          



                    ID                  Date                Data Source

 

                    [id number]     2020 09:27:00 AM EDT [facility] [address] [place]

[place] [address] PHONE: [phone]

FAX: [phone]  Name .................. : JIM ZAPATA              
Acct Number.................. : [id number] [facility] : [id number] Stay type .............
: E/R                             Discharge Date......... ... : 20 Admit 
Date ......... : 20                           Admit Phys 
.................... : TURRIN COREY Date of Birth ....... : 2000            
           Family Phys ................... : UNKNOWN CO Phone ..................
: [phone]                   Age ................................ : 20 Film#
.................. .:[id number]                         Sex 
................................. : M  Unsigned transcriptions are preliminary 
reports and do not represent a medical or legal document        CT CERV SPINE 
W/O CONTRAS 69664       COMPLETE:20 16:19 TRENT 28733                       
      Reason(s): syncope with fall     CT OF THE CERVICAL SPINE WITHOUT 
CONTRAST:  INDICATION: Syncope with fall.  FINDINGS: There is straightening of 
the normal lordotic curvature. This could indicate underlying spasm. No clear 
evidence of an acute fracture is identified. The visualized portions of the 
upper lung fields are clear. The soft tissues of the neck are within normal 
limits.  IMPRESSION: Reversal of the normal lordosis likely secondary to 
underlying spasm. Examination is otherwise unremarkable. No acute findings are 
otherwise identified.  While performing the above CT examination, radiation dose
reduction was accomplished utilizing automated exposure control, adjusting of 
the mA and kV based on the patient's body size and/or the use of imperative 
reconstructive techniques.  CT dose: 194.3 mGycm  Examination dictated by 
ILA Sanchez. Examination was reviewed with Jc Flynn MD, 
radiologist at the time of this dictation.   ___________________________________
Electronically Reviewed and Signed By JC FLYNN MD                      
     , 20 09:27, [facility]  Transcribe Initials: KELL , Transcribe Date: 20 
01:13, Dictation Date:   Copy for: NEL TAPIA                 via fax 
Copy for: EMERGENCY DEPT                  via modem Copy for: [address] [facility] REC       
                                                                                
         Page 1 of 2 [facility] [address] [place] [address]
PHONE: [phone] FAX: [phone]  Name .................. : JIM ZAPATA           Acct Number.................. : [id number] ROOM. ........
......... : TR-05                         Number ................... : [id number] 
Stay type ............. : E/R                          Discharge Date......... 
... : 20 Admit Date ......... : 20                        Admit Phys
.................... : CESAR NICOLE Date of Birth ....... : 2000            
        Family Phys ................... : UNKNOWN CO Phone .................. : 
[phone]                Age ................................ : 20 Film# 
.................. .:[id number]                      Sex 
................................. : M  Unsigned transcriptions are preliminary 
reports and do not represent a medical or legal document        CT CERV SPINE 
W/O CONTRAS 94531       COMPLETE:20 16:19 TRENT 21584                       
      Reason(s): syncope with fall   DISCHARGED                                 
                                                                Page 2 of 2   









          Name      Value     Range     Interpretation Code Description Data Anabella

rce(s) Supporting 

Document(s)

 

                                                                       









                    ID                  Date                Data Source

 

                    [id number]     2020 09:27:00 AM EDT [facility] [address] [place] [address] PHONE: [phone]

FAX: [phone]  Name .................. : JIM ZAPATA              
Acct Number.................. : [id number] ROOM. ................. : TR05        
                   Number ................... : [id number] Stay type .............
: E/R                             Discharge Date......... ... : 20 Admit 
Date ......... : 20                           Admit Phys 
.................... : CESAR NICOLE Date of Birth ....... : 2000            
           Family Phys ................... : UNKNOWN CO Phone ..................
: [phone]                   Age ................................ : 20 Film#
.................. .:[id number]                         Sex 
................................. : M  Unsigned transcriptions are preliminary 
reports and do not represent a medical or legal document          CT HEAD W/O 
CONTRAST         27614    COMPLETE:20 16:19 TRENT 70519                     
  Reason(s): syncope with fall; contusion to post     CT SCAN OF THE HEAD 
WITHOUT CONTRAST:  INDICATION: Syncope with fall, contusion to post.  FINDINGS: 
No intra-axial or extra-axial collections of fluid. Ventricles and sulci 
unremarkable. No midline shift or mass effect.  Visualized paranasal sinuses and
mastoid air cells unremarkable.  IMPRESSION: No acute intracranial process.  
While performing the above CT examination, radiation dose reduction was 
accomplished utilizing automated exposure control, adjusting of the mA and kV 
based on the patient's body size and/or the use of imperative reconstructive julito
hniques.  CT dose: 902.2 mGycm  Examination dictated by ILA Sanchez. 
Examination was reviewed with Jc Flynn MD, radiologist at the time of 
this dictation.   ___________________________________ Electronically Reviewed 
and Signed By JC FLYNN MD                            , 20 09:27, 
[facility]  Transcribe Initials: KELL , Transcribe Date: 20 01:09, Dictation Date: 
 Copy for: NEL TAPIA                 via fax Copy for: EMERGENCY DEPT 
                via modem Copy for: 710 [facility] REC                                 
                                                                Page 1 of 2 
[facility] [address] [place] [address] PHONE: [phone] 
FAX: [phone]  Name .................. : JIM SOTO J           Acct 
Number.................. : [id number] ROOM. ................. : TR-05             
          MR Number ................... : [id number] Stay type ............. : E/R  
                       Discharge Date......... ... : 20 Admit Date 
......... : 20                        Admit Phys .................... : 
CESAR NICOLE Date of Birth ....... : 2000                     Family Phys 
................... : UNKNOWN CO Phone .................. : [phone]        
       Age ................................ : 20 Film# .................. 
.:[id number]                      Sex ................................. : M  Unsign
ed transcriptions are preliminary reports and do not represent a medical or 
legal document          CT HEAD W/O CONTRAST         53404    COMPLETE:20 
16:19 TRENT 22128                        Reason(s): syncope with fall; contusion 
to post   DISCHARGED                                                            
                                     Page 2 of 2   









          Name      Value     Range     Interpretation Code Description Data Anabella

rce(s) Supporting 

Document(s)

 

                                                                       









                    ID                  Date                Data Source

 

                    [id number]     2020 09:09:00 AM EDT [facility] [address] [place] [address] PHONE: [phone]

FAX: [phone]  Name .................. : JIM ZAPATA             Acct
Number.................. : [id number] ROOM. ................. : -05             
            MR Number ................... : [id number] Stay type ............. : E/R
                           Discharge Date......... ... : Admit Date ......... : 
20                          Admit Phys .................... : TURRIN COREY 
Date of Birth ....... : 2000                       Family Phys 
................... : UNKNOWN CO Phone .................. : [phone]        
         Age ................................ : 20 Film# .................. 
.:[id number]                        Sex ................................. : M  
Unsigned transcriptions are preliminary reports and do not represent a medical 
or legal document            CHEST PORTABLE                 10362   
COMPLETE:20 11:30 [facility] [id number]                                           Ronna
son(s): syncope     CHEST PORTABLE, 20:  HISTORY: Syncope.  FINDINGS: The 
cardiac and mediastinal silhouettes appear normal and the lungs are clear. The 
bones and soft tissues are normal. The upper abdomen is unremarkable.  
IMPRESSION: No acute disease identifiable.    
___________________________________ Electronically Reviewed and Signed By Rylan Kearney MD                 , 20 09:09, [facility]  Transcribe Initials: DEEP, 
Transcribe Date: 20 13:01, Dictation Date:   Copy for: NEL [facility]
                via fax Copy for: EMERGENCY DEPT                  via modem Copy
for: [facility] REC DISCHARGED                                                     
                                              Page 1 of 1   









          Name      Value     Range     Interpretation Code Description Data Anabella

rce(s) Supporting 

Document(s)

 

                                                                       









                    ID                  Date                Data Source

 

                    [id number]     2020 09:08:00 PM EDT [facility] [address] [place] [address] PHONE: [phone]

FAX: [phone]  Name ..............: JIM ZAPATA Acct Number 
...........................: [id number] ROOM. ............: TR-05               
Number ............................: [id number] Stay type.........: E/R             
   Discharge Date...............:20 Admit Date .....: 20            
 Admit Phys .............................: CESAR NICOLE Date of Birth ..: 
2000            Family Phys ...........................: UNKNOWN CO 
Phone..............: [phone]       Age.................................:20 
Film# ...............:[id number]            Sex.................................:M  
Unsigned transcriptions are preliminary reports and do not represent a medical 
or legal document               [facility] access hospital                 80600    COMPLETE:20 
14:56  [id number]               Critical access hospital                 38578    COMPLETE:20 
14:56 WL [id number]               EKG                 64254    COMPLETE:20 
14:58 WL 00805     Please See Scanned Results.   









          Name      Value     Range     Interpretation Code Description Data Anabella

rce(s) Supporting 

Document(s)

 

                                                                       









                    ID                  Date                Data Source

 

                    [id number]      2020 10:02:00 AM EDT [facility]

 

                                                                                

                                        

           1                                         OrderSheet                 
                    [facility]                                      
Emergency Department                              [address] [place] [address]                                Phone #: [phone]          
                              2020 10:02                      
----------------------------------------------------                            
     Patient: SOTO FERNANDEZ                                MRN: [id number]    
 Acct#: [id number]                              Sex: M : 2000 Age: 
20yWEIGHT:68.9 kg (S) HEIGHT:74 inches (S) BMI:19.5ALLERGIES: No Known Drug 
AllergyCHIEF COMPLAINT: syncope, [id number]: SeizureLAB ORDERSOrder Description
     Priority      Entered              Acknowledged        InitialedCBC w Diff 
           STAT          10:36 2020                         10:42 Marshal Nichols 
RN                                    P.A.-C;CMP                    STAT        
 10:36 2020                         10:42 Marshal Nichols RN                       
            P.A.-C;Lipase                 STAT          10:36 2020        
                10:42 Marshal Nichols RN                                    P.A.-
C;PT/PTT                 STAT          10:36 2020                         
10:42 Marshal Nichols RN                                    P.A.-C;Troponin-T            
STAT          10:36 2020                         10:42 Marshal Nichols RN          
                         P.A.-C;Magnesium              STAT          10:36 
2020                         10:42 Marshal Nichols RN                              
     P.A.-C;D-Dimer                STAT          10:36 2020               
         10:42 Marshal Nichols RN                                    P.A.-C;TSH          
         STAT          10:36 2020                         10:42 Marshal Nichols RN 
                                  P.A.-C;Urinalysis (Clean      STAT          
10:36 2020                         12:25 Raymond)                      
       Eligio Nichols RN                        
           P.A.-C;Urine Drug Screen      STAT          10:36 2020         
               12:25 Marshal Nichols RN                                    P.A.-C;BNP   
                STAT          10:36 2020                        10:42 
Marshal Hussein                                         OrderSheet     
                                [facility]                          
           Emergency Department                              [address] [place] [address]                                Phone #: [phone]- [phone]                                         2020 10:02                   
  ----------------------------------------------------                          
       Patient: SOTO FERNANDEZ                                MRN: [id number]  
   Acct#: [id number]                              Sex: M : 2000 Age: 20y 
                                  Eligio Nichols RN                                    P.A.-C;Salicylate Level       STAT        
 10:43 2020                         10:48 Marshal Nichols RN                       
            P.A.-C;Acetaminophen          STAT          10:43 2020        
                10:48 Luis Nichols RN                                    P.A.-
C;CPK                    STAT          10:43 2020                         
10:49 Marshal Nichols RN                                    P.A.-C;DIAGNOSTIC STUDY 
ORDERSOrder Description  Priority     Entered                  Acknowledged     
   InitialedChest Portable 1   STAT         10:36 2020                    
         10:42 Shasta Nichols RN(Oxygen?(No))                  P.A.-C;                  
Reason for Study: syncopeCT Head W/O Cont   STAT         10:36 2020       
                      10:42 Marshal(Oxygen?(No))                  Eligio Nichols RN                               P.A.-C;
                 Reason for Study: syncope with fall; contustion to postCT Spine
Cervical  STAT         10:36 2020                              10:42 
MarshalW/O Cont                       Eligio Nichols RN(Oxygen?(No))                  P.A.-C;                  Reason for 
Study: syncope with fallMEDICATION/IV/DRIP/FLUID ORDERSOrder Description    
Priority   Entered                   Acknowledged        InitialedIV NS : Bolus 
1000              10:36 2020                              10:46 PetermL, 
then 100 mL/hr             Eligio Nichols RN                               P.A.-C;Zofran IVP 4 mg                 10:36 
2020                              10:46 Marshal Nichols RN                         
     P.A.-C;Ativan IVP 2 mg                 11:07 2020                    
         11:10 Susana Fermin(HIGH ALERT                    Susana Fermin RN;        
                      RNMEDICATION,                    Verbal order per;NOW)    
                      Jb Dominguez M.D.Ativan 
IVP 2 mg                 11:08 2020                              11:10 
Susana Fermin(HIGH ALERT                    Susana Fermin RN;                       
       RNMEDICATION,                    Verbal order per;                       
                                                                    3           
                            OrderSheet                                     
[facility]                                     Emergency Department 
                           [address] [place] [address]                 
             Phone #: [phone]- [phone]                                  
     2020 10:02                     
----------------------------------------------------                            
    Patient: SOTO FERNANDEZ                               MRN: [id number]      
Acct#: [id number]                             Sex: M : 2000 Age: 20yNOW) 
                            Jb Dominguez M.D.Keppra 1000 mg                     11:12 2020                         
11:25 PeterIVPB X1 dose: 1000                Eligio Nichols RNmg with Dextrose                  P.A.-C;Intravenous 100 
mL(D5W)GENERAL ORDERSOrder Description     Priority     Entered               
Acknowledged        InitialedBlood Pressure                     10:36 2020
                         10:42 Leti Nichols RN                              
   P.A.-C;Cardiac Monitor                    10:36 2020                   
      10:42 Marshal(continuous)                      Eligio Nichols RN                                  P.A.-C;EKG                
               10:36 2020                          10:42 Marshal Nichols RN         
                        P.A.-C;NPO                                10:36 
020                          10:42 Marshal Nichols RN                              
   P.A.-C;Obtain Old EKG                     10:36 2020                   
      10:42 Marshal Nichols RN                                  P.A.-C;Obtain Old Records 
               10:36 2020                          10:42 Marshal Nichols RN         
                        P.A.-C;Pulse oximeter                     10:36 
2020                          10:42 Marshal(Continuous)                     
Eligio Nichols RN                              
   P.A.-C;Saline Lock                        10:36 2020                   
      10:42 Marshal CATES.A.-C;Vitals             
               10:36 2020                          10:42 Marshal CATES.A.-C;EKG                                11:56 
2020                          11:56 Marshal Nichols RN;                           Simone GERONIMO                              
   Verbal order per (                                  Verbal order read        
                         back and verified );                                  
Eligio Wall                                            OrderSheet                
                      [facility]                                    
  Emergency Department                               [address] [place] [address]                                 Phone #: [phone]- [phone]      
                                   2020 10:02                       
----------------------------------------------------                            
      Patient: SOTO FERNANDEZ                                 MRN: [id number]  
   Acct#: [id number]                               Sex: M : 2000 Age: 20y
                                     Nel DAVIDSON[Electronically signed by 
Marshal Nichols RN (14:31 2020)][Electronically signed by Eligio Marlow P.A.-C (19:20 2020)][Electronically locked by Marshal Nichols RN 
(14:2020)]  









          Name      Value     Range     Interpretation Code Description Data Anabella

rce(s) Supporting 

Document(s)

 

                                                                       









                    ID                  Date                Data Source

 

                    [id number]      2020 10:02:00 AM EDT [facility]

 

                                                                                

                                        

         1                            Medication Reconciliation Report          
                          [facility]                                
    Emergency Department                             [address] [place] [address]                               Phone #: [phone]- [phone]        
                               2020 10:02                     
----------------------------------------------------                            
    Patient: SOTO FERNANDEZ                               MRN: [id number]      
Acct#: [id number]                             Sex: M : 2000 Age: 
20yWeight:      68.9 kgHeight/Length:      74 in.BMI:         19.5ALLERGIES: No 
Known Drug AllergyThe patient's Home Medications are listed below:NONE.The 
source(s) of the original Home Medication information:Not obtained.The following
Medications were given to the patient in the Emergency Department:IV NS IV 
Fluids bolus 0, then 1000 mL/hr, administered: 2020 10:46:00 AMZofran [IVP]
 IVP 4 mg, administered: 2020 10:46:00 AMAtivan [IVP] IVP 2 mg, 
administered: 2020 11:03:00 AMAtivan [IVP] IVP 2 mg, administered: 
2020 11:05:00 AMKeppra [IVPB] IVPB bolus 0, then 1000 mg 400 mL/hr, 
administered: 2020 11:25:00 AMIV NS IV Fluids bolus 0, then 100 mL/hr, 
administered: 2020 12:20:00 PMThe following Medications were prescribed to 
the patient:None.  









          Name      Value     Range     Interpretation Code Description Data Anabella

rce(s) Supporting 

Document(s)

 

                                                                       









                    ID                  Date                Data Source

 

                    [id number]      2020 10:02:00 AM EDT [facility]

 

                                                                                

                                        

              1                            Medication Administration Record     
                                 [facility]                         
             Emergency Department                              [address] [place] [address]                                Phone #: [phone] ext- [phone]                                         2020 10:02                   
   ----------------------------------------------------                         
         Patient: SOTO FERNANDEZ                                MRN: [id number]
      Acct#: [id number]                              Sex: M : 2000 Age: 
20yWeight: 68.9 kgHeight/Length: 74 inBMI:    19.5ALLERGIES:     No Known Drug 
Allergy      Date/Time                  Medication Administered            
Medication OrderedStart                       IV NS                             
  IV NS : Bolus 1000 mL, then 20678:46 2020            Dose: IV Fluids    
                 mL/hrPeter JUANPABLO Nichols         Rate: 1000 mL/hr over 1 
hour(s)----                        Dispensed: 1000 mL bagStop                   
     Site: #1 left AC12:17 2020BHAVNA Ledezmatart                       
IV NS                               IV NS : Bolus 1000 mL, then 52830:20 
2020            Dose: IV Fluids                     mL/Ibeth Nichols RN  
       Rate: 100 mL/hr over 9 hour(s)----                        Dispensed: 1000
 mL bagStop                        Site: #1 left AC14:31 2020Marshal Nichols RNGiven                       ZOFRAN [IVP] (ONDANSETRON HCL)      Zofran IVP 4 
mg10:46 2020            Dose: 4 mg IVPPerika Nichols RN         Site: #1 
left ACGiven                       ATIVAN [IVP] (LORAZEPAM)            Ativan 
IVP 2 mg (HIGH ALERT11:03 2020            Dose: 2 mg IVP                  
    MEDICATION, NOW)Susana Fermin RN         Site: #1 left ACGiven               
        ATIVAN [IVP] (LORAZEPAM)            Ativan IVP 2 mg (HIGH ALERT11:05 
2020            Dose: 2 mg IVP                      MEDICATION, NOW)Susana Fermin RN         Site: #1 left ACStart                       KEPPRA [IVPB] 
(LEVETIRACETAM)       Keppra 1000 mg IVPB X1 dose:11:25 2020            
Dose: 1000 mg IVPB                  1000 mg with DextroseMarshal Nichols RN        
 Rate: 400 mL/hr over 15 minute(s)   Intravenous 100 mL (D5W)----               
         Dispensed: 100 mL bagStop                        Site: #1 left AC11:45 
2020Marshal Nichols RN  









          Name      Value     Range     Interpretation Code Description Data Anabella

rce(s) Supporting 

Document(s)

 

                                                                       









                    ID                  Date                Data Source

 

                    [id number]      2020 10:02:00 AM EDT [facility]

 

                                                                                

                                     1  

                                    General Instructions                        
              [facility]                                      
Emergency Department                              [address] [place] [address]                                Phone #: [phone]- [phone]         
                                2020 10:02                      
----------------------------------------------------                            
      Patient: SOTO FERNANDEZ                                MRN: [id number]   
   Acct#: [id number]                              Sex: M : 2000 Age: 
20yNew onset generalized seizure. No history of epilepsy.(Electronically signed 
by Eligio Marlow P.A.-C 2020 19:20)  









          Name      Value     Range     Interpretation Code Description Data Anabella

rce(s) Supporting 

Document(s)

 

                                                                       









                    ID                  Date                Data Source

 

                    [id number]      2020 10:02:00 AM EDT [facility]

 

                                                                                

                                        

                           1                                    Clinical Report 
- Nurses                                      [facility]            
                          Emergency Department                              [address] [place] [address]                                Phone #: [phone] ext- [phone]                                         2020 10:02     
                 ----------------------------------------------------           
                       Patient: SOTO FERNANDEZ                             
   MRN: [id number]      Acct#: [id number]                              Sex: M : 
2000 Age: 20yTRIAGEArrived by EMS. Historian: patient.Acuity: LEVEL 
3.Chief Complaint: SINGLE SYNCOPAL EPISODE.This occurred just prior to arrival. 
This is a new problem. Does not recall events relating to the worsening.Started 
while participating in moderate exertion. Patient was witnessed to be last known
 well.Witnessed: Event was witnessed. 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
 the incident, and per witnesseshe had a syncopal episode with an unknown time 
down, per EMS pt has been lethargic, , pt voiceshe may have had the same 
situation in his younger years, pt voices abdominal pain).EMS Treatment PTA:EMS 
treatment verbally communicated and report reviewed. See report. Finger stick 
glucose performed(135).SEPSIS SCREEN: SIRS Screen negative. Sepsis Screen 
negative. No suspected or confirmed signs ofinfection present.BARAK COMA 
SCORE: 15- eyes open- spontaneous (4); best verbal response- oriented (5); 
bestmotor response- obeys commands (6). --10:18 20 Marshal Nichols, RN10:10 
20. BP: 114/75. MAP: 88. HR: 51. RR: 17. O2 saturation: 100%. Temp: 96.9 
F. Pain levelnow: 7/10. --10:18 20 Marshal Nichols RN.Weight: 68.9 kg stated. 
Height/Length: 74 inches Per Patient. BMI: 19.5. --10:09 20 Marshal Nichols RN.MedicationsNone. --10:14 20 Marshal Nichols RN.AllergiesNo Known Drug
 Allergy. --10:14 20 Marshal Nichols RN.PROBLEMS:no known problems.ADDITIONAL 
SURGERIES:Hip Surgery. --10:15 20 Marshal Nichols RN.History                  
                                                                                
               2                                     Clinical Report - Nurses   
                                    [facility]                      
                 Emergency Department                               [address] [place] [address]                                 Phone #: [phone]
[phone]                                          2020 10:02        
               ----------------------------------------------------             
                      Patient: SOTO FERNANDEZ                              
   MRN: [id number]      Acct#: [id number]                               Sex: HERMELINDO : 
2000 Age: 20y PAST MEDICAL HX: Immunizations: up-to-date. SOCIAL HX: Never
 smoker. Occasional alcohol use; consumes beer occasionally. No drug use. He was
 offered HIV testing but declined and hepatitis C testing but declined. He has 
not traveled outside the [place] Infectious disease exposure: No infectious disease
 exposure. The patient was not exposed to Coronavirus. SELF HARM ASSESSMENT: 
Self harm assessment was performed. The patient answered "no" to the question(s)
 "Have you recently felt down, depressed, or hopeless?", "Do you have thoughts 
of harming or killing yourself?", "Do you have a plan for harming or killing 
yourself?", "Have you recently had thoughts about harming or killing others?", 
"Do you have any dangerous items in your possession?", "Have you noticed less 
interest or pleasure in doing things?", "Are you here because you tried to hurt 
yourself?" and "Have you ever tried to hurt yourself before today?". ABUSE 
ASSESSMENT: No report of abuse. NUTRITIONAL RISK ASSESSMENT: The nutritional 
risk assessment revealed no deficiencies. FUNCTIONAL ASSESSMENT: Functional 
assessment: no impairments noted. LEARNING NEEDS ASSESSMENT: The learning needs 
assessment revealed no barriers. FALL RISK ASSESSMENT: Fall risk assessment 
completed. No risk factors identified. SKIN INTEGRITY ASSESSMENT: Skin integrity
 risk assessment completed. No skin integrity risk identified. --10:18 20 
Marshal Nichols RN. Interventions To treatment room. --10:18 20 Marshal Nichols RN. 10:12 2020 Site #1 started via IV in the left antecubital space with 
an 18g angiocath, with aseptic technique and good blood return; one attempt. 
Saline lock flushed with 10 mL saline (per Plato medic). --10:20 
Marshal Nichols RN.PHYSICAL ASSESSMENTTo room via stretcher.GENERAL / NEURO / 
PSYCH: Oriented X 4. Appears in distress. Alert. Speech within normal 
limits.HEENT: No facial asymmetry noted. Pupils equal, round and reactive to 
light.RESPIRATORY: Breath sounds within normal limits. Respirations not 
labored.CVS: Cardiac rhythm: sinus bradycardia. Capillary refill less than 2 
seconds.GI / : Abdomen soft and nontender.SKIN: Skin is warm and dry. --10:23 
20 Marshal Nichols RN.NURSING PROGRESS NOTESCardiac monitor, NIBP monitor and 
pulse oximeter placed on patient; cardiac monitor- Lead II; monitor             
                                                                                
                 3                                  Clinical Report - Nurses    
                                 [facility]                         
            Emergency Department                             [address] [place] [address]                               Phone #: [phone]- [phone]                                        2020 10:02                    
 ----------------------------------------------------                           
      Patient: SOTO FERNANDEZ                               MRN: [id number]    
  Acct#: [id number]                             Sex: M : 2000 Age: 
20yalarms on. EKG time: (10:12 2020). EKG was performed by a nurse and 
shown to the EDphysician. Patient gowned. Head of bed elevated. Reassurance 
given. Call light placed in reach.Side rails up x 2. Bed placed in lowest 
position. Brakes of bed on. Patient ready for evaluation- EDphysician and PA 
notified. --10:24 20 Marshal Nichols RN10:41 20. BP: 119/79. MAP: 92. 
HR: 54. RR: 16. O2 saturation: 100%. Pain level now: 7/10.--10:42 20 Jayne Ledezma stick glucose: 144; performed by nurse; result shown to the PA. 
--10:42 20 Marshal Nichols RN10:46 2020 Started bag #1 1000 mL IV Fluids
 IV NS; at 1000 mL/hr over 1 hour(s) via site #1 via IVpump. Allergies verified 
and confirmed 5 rights. IV patency established. IV site checked: no pain, 
redness,or swelling. IV flushed thoroughly pre- and post-medication 
administration. Information reviewed withpatient including reason for taking 
this medication. Verbalizes understanding. --10:46 20 JUANPABLO Victor10:46 
2020 Zofran (Ondansetron HCl) IVP 4 mg given over 2 minute(s) via site #1.
 Allergies verifiedand confirmed 5 rights. IV patency established. IV site 
checked: no pain, redness, or swelling. IV flushedthoroughly pre- and post-
medication administration. IVP given by RN. Information reviewed with 
patientincluding reason for taking this medication. Verbalizes understanding. 
--10:47 20 Jayne Ledezma stick glucose: 162; performed by nurse; 
result shown to the PA. --11:09 20 Susana Fermin RN11:00 20. ( walked 
by pt room and noted pt to be seizing, Dr. Gregorio in room immediately andordered 
ativan 2 mg IVP stat, pt stopped seizure activity within few sec. of being in 
room, disoriented andmumbles. oxygen at 3 L NC applied along with side rail 
pads). --11:17 20 Susana Fermin RN11:03 2020 Ativan (LORazepam) IVP 2 
mg given via site #1. Allergies verified and confirmed 5rights. IV patency 
established. IV site checked: no pain, redness, or swelling. IV flushed 
thoroughly pre-and post-medication administration. IVP given by RN. Information 
reviewed including sedative warning.--11:10 6/17/20 Susana Fermin RN11:05 
2020 Ativan (LORazepam) IVP 2 mg given via site #1. IV patency 
established. IV sitechecked: no pain, redness, or swelling. IV flushed 
thoroughly pre- and post-medication administration. IVPgiven by RN. Information 
reviewed. Verbalizes understanding. --11:10 6/17/20 Susana Fermin RNEKG time: 
(11:21 2020). EKG was performed by a tech and shown to the PA. --11:24 
20[facility] TechCons[facility]o ER Pdlo355:25 2020 Started 1000 mg of 
Keppra (levETIRAcetam) IVPB in bag #1 100 mL; at 400 mL/hr over15 minute(s) via 
site #1. via IV pump. Allergies verified and confirmed 5 rights. IV patency 
established. IVsite checked: no pain, redness, or swelling. IV flushed thorough
ly pre- and post-medication administration.Information reviewed with patient 
including reason for taking this medication. Verbalizes understanding.--11:20 Marshal Nichols RN                                                         
                                                        4                       
             Clinical Report - Nurses                                       
[facility]                                       Emergency 
Department                               [address] [place] [address]   
                              Phone #: [phone]- [phone]                 
                         2020 10:02                       
----------------------------------------------------                            
       Patient: SOTO FERNANDEZ                                 MRN: [id number] 
     Acct#: [id number]                               Sex: M : 2000 Age: 
20y Patient transported to CT by stretcher with nurse and radiology tech. 
--11:26 20 Marshal Nichols RN Patient returned from CT by stretcher with nurse
 and radiology tech. --11:41 20 Marshal Nichols RN 11:45 2020 Keppra 
IVPB via IV site #1 Discontinued: infused. Total amount infused: 100 mL. IV 
patency established. IV site checked: no pain, redness, or swelling. IV flushed 
thoroughly. --11:45 20 Marshal Nichols RN ( pt quite lethargic after returning
 from CT, he is able to answer questions and remember nurses name). --11:49 
20 Marshal Nichols RN 11:45 20. BP: 122/49. MAP: 73. HR: 104. RR: 16. O2
 saturation: 100%. Pain level now: 0/10. --11:49 20 Marshal Nichols RN 11:56 
2020 Site #2 started via IV in the left forearm with an 20g angiocath, 
with aseptic technique and good blood return; one attempt. Saline lock flushed 
with 10 mL saline. --11:56 20 Marshal Nichols RN 12:17 2020 IV Fluids IV
 NS via IV site #1 Discontinued: infused. Total amount infused: 1000 mL. IV 
patency established. IV site checked: no pain, redness, or swelling. IV flushed 
thoroughly. --12:17 20 Marshal Nichols RN 12:20 2020 Started bag #2 1000
 mL IV Fluids IV NS; at 100 mL/hr over 9 hour(s) via site #1 via IV pump. 
Allergies verified and confirmed 5 rights. IV patency established. IV site 
checked: no pain, redness, or swelling. IV flushed thoroughly pre- and post-
medication administration. Information reviewed with patient including reason 
for taking this medication. Verbalizes understanding. --12:20 20 Marshal Nichols RN 12:55 20. BP: 108/79. MAP: 88. HR: 105. RR: 13. O2 saturation: 
100%. --12:56 20 Consuelo Morales ED, ER Tech1 14:31 2020 IV 
Fluids IV NS via IV site #1 Discontinued: STOPPED upon transfer. Total amount 
infused: 250 mL. IV patency established. IV site checked: no pain, redness, or 
swelling. IV flushed thoroughly. --14:31 20 Marshal Nichols RN. Intake Output 
Urine output: 400 mL with return of yellow-colored clear urine. Urine: normal 
smelling. --12:25 20 Marshal Nichols RN.DISPOSITION / DISCHARGE Transferred to
 [facility]. Visit overview and summary of care (CCDA) provided to
 transport team and EMS via paper and fax. Transported via ambulance by EMS with
 monitor, IV and O2. Report was given to a nurse via a phone call and visit 
overview. Report included information regarding                                 
                                                                            5   
                                 Clinical Report - Nurses                       
                [facility]                                       
Emergency Department                               [address] [place] [address]                                 Phone #: [phone]- [phone]      
                                    2020 10:02                       
----------------------------------------------------                            
       Patient: SOTO FERNANDEZ                                 MRN: [id number] 
     Acct#: [id number]                               Sex: M : 2000 Age: 
20y patient's care, treatment and condition including: recent changes, current 
and abnormal vital signs and abnormal labs. Report included treatment 
information regarding medications given or pending; type and amount of IV fluids
 and medications infusing and total volume infused. All questions were answered.
 Report was acknowledged and care was transferred. (Mary Jane GERONIMO). --13:22 20 
Marshal Nichols RN 13:20 20. BP: 101/66. MAP: 77. HR: 116. RR: 16. O2 
saturation: 100% on nasal cannula at 2 liters/minute. Temp: 97.8 F (tympanic). 
Pain level now: 0/10. --13:22 20 Marshal Nichols RN Departure time: 14:31 
2020. --14:31 20 Marshal Nichols RN 14:30 20. BP: 104/56. MAP: 72.
 HR: 95. RR: 16. O2 saturation: 98%. Pain level now: 0/10. --14:31 20 Marshal Nichols RN.Locked/Released at 2020 14:31 by Peter Nichols, RN  









          Name      Value     Range     Interpretation Code Description Data Anabella

rce(s) Supporting 

Document(s)

 

                                                                       









                    ID                  Date                Data Source

 

                    [id number]      2020 10:02:00 AM EDT [facility]

 

                                                                                

                                        

                          1                           Clinical Report - 
Physicians/Mid Levels                                         [facility]                                         Emergency Department           
                      [address] [place] [address]                      
             Phone #: [phone]- [phone]                                  
          2020 10:02                         
----------------------------------------------------                            
         Patient: SOTO FERNANDEZ                                   MRN: 
[id number]      Acct#: [id number]                                 Sex: M : 
2000 Age: 20y Time Seen: 10:17 2020; initial patient contact, 
initial documentation. Arrived- By ambulance. Historian- patient and EMS 
personnel. Disposition decision: 12:58 2020.HISTORY OF PRESENT ILLNESS 
Chief Complaint: SINGLE SYNCOPAL EPISODE. This occurred just prior to arrival. 
The patient has recovered. Patient was witnessed to be last known well. Event 
was witnessed. Witnessed by co-worker. The patient lost consciousness and 
collapsed. Seizure activity observed (per co-workers/EMS). At time of event, he 
was standing. Had a single episode. Currently has nausea. (Pt provides hx, 
slightly limitied as he does not remember much. Pt does provide some. Sts that 
he did not do PT today. Went to [facility]. Conducted a [facility] charge. Sts 
that is about it for remembering; does not remember incident other than standing
 position.). Similar symptoms previously. Patient has had similar symptoms once.
 ( Pt reports one episode of seizure activity when child (<1yo)). Recent medical
 care: Not recently seen/assessed.REVIEW OF SYSTEMSNo headache, dizziness, 
weakness, chest pain or palpitations. No abdominal pain, diarrhea, black 
stools,numbness or bloody stools. No fever, sore throat, difficulty with 
urination, skin rash or enlarged lymphnodes. No cough or joint pain. The patient
 has had vomiting. All other systems reviewed and arenegative.PAST HISTORYSee 
nurses notes. Problems: no known problems. Additional Surgeries: Hip Surgery. 
Medications: None. Allergies: No Known Drug Allergy.SOCIAL HISTORYNever smoker. 
Occasional alcohol use. No drug use.                                            
                                                                2               
           Clinical Report - Physicians/Mid Levels                              
         [facility]                                       Emergency 
Department                               [address] [place] [address]   
                              Phone #: [phone]- [phone]                 
                         2020 10:02                       
----------------------------------------------------                            
       Patient: SOTO FERNANDEZ                                 MRN: [id number] 
     Acct#: [id number]                               Sex: M : 2000 Age: 
20yADDITIONAL NOTESThe nursing notes have been reviewed.PHYSICAL EXAMVital 
Signs: 2020 10:41 BP: 119/79. MAP: 92. HR: 54. RR: 16. O2 saturation: 100
%. Pain level now:7/10.2020 10:10 BP: 114/75. MAP: 88. HR: 51. RR: 17. O2 
saturation: 100%. Temp: 96.9 F. Pain levelnow: 710. Have been reviewed. Oxygen 
saturation normal.Appearance: No acute distress.Eyes: Pupils equal, round and 
reactive to light.ENT: Normal ENT inspection. Airway intact. TM's normal. Ears 
normal. Nose normal. Nares normal.Pharynx normal. Moist mucous membranes. Uvula 
midline. Voice normal.Neck: Normal inspection. Neck supple.CVS: Bradycardia. 
Normal heart rhythm. No JVD present. Pulses normal. Capillary refill 
normal.Strong peripheral pulses. Heart sounds normal. Pulses: right radial 2+; 
left radial 2+; right dorsalis pedis2+; left dorsalis pedis 2+; right posterior 
tibial 2+; left posterior tibial 2+.Respiratory: Chest normal on inspection. No 
respiratory distress. Unlabored respirations. Lungs clear.Good chest movement. 
Breath sounds normal and equal. Chest nontender.Abdomen: Normal inspection. Soft
 and nontender. Bowel sounds normal. No distention.Extremities: Extremities 
exhibit normal ROM. No lower extremity edema. No calf tenderness. No 
lowerextremity edema.Neuro: Alert. Oriented X 3. Mood/affect normal. Speech 
normal. Cranial nerves II through XII intactand normal (as tested). No 
cerebellar findings. No abnormal finger-nose test. No motor deficit. Nosensory 
deficit.Psych: Cognition normal. Thought process and content normal. Insight and
 judgement normal.LABS, X-RAYS, AND EKGEKG: Bradycardia (50). Sinus Rey; RAD; 
early repolarization; borderline ECG. Discussed and reviewed with/by attendign. 
EKG #2: Rate: 157. Afib w/ rapid vent response w/ premature aberrajntly 
conducted complexes; RAD; nonspecific abnormality, probably gitalis effect; 
abnormal ECG. Discussed and reviewed with attending. EKG #3: Normal sinus 
rhythm. Rate: 96. NSR; RAD; borderline ECG. Discussed and reviewed with/by 
attending. Chest X-ray: (Christel quiñonez Ryan - 2020 10:56:45 AM nad). 
The X-rays were interpreted by the radiologist. CT C-Spine: (Gee barrera Michael - 2020 11:46:34 AM nad, losss of lordosis, could be spasm). The 
study was interpreted by the radiologist. CT Head: (Gee barrera Michael -
 2020 11:42:12 AM No acute disease). The study was interpreted by the 
radiologist.                                                                    
                                       3                         Clinical Report
 - Physicians/Mid Levels                                      [facility]                                      Emergency Department              
                [address] [place] [address]                            
    Phone #: [phone]- [phone]                                         
2020 10:02                      
----------------------------------------------------                            
      Patient: SOTO FERNANDEZ                                MRN: [id number]   
   Acct#: [id number]                              Sex: M : 2000 Age: 
20yLaboratory Tests:Salicylate Level:      (LINSEY: 2020 10:44)            (
 MsgRcvd 2020 11:27) Final results **Test**                               
    **Result**      **Flag**    **Units**      **(Reference)** SALICYLATE       
                          <0.3            L           mg/dL           (2.0 - 
20.0)Acetaminophen Level:     (LINSEY: 2020 10:44)          ( [facility] 
2020 11:17) Final results **Test**                                   
**Result**      **Flag**    **Units**      **(Reference)** ACETAMINOPHEN        
                      <5.0                        UG/ML           (0.0 - 
30.0)CPK:   (LINSEY: 2020 10:44)             ( [facility] 2020 11:28) 
Final results **Test**                                   **Result**      
**Flag**    **Units**      **(Reference)** CPK                                  
      333             H           U/L             (30 - 170)CBC w Diff:      
(LINSEY: 2020 10:44)           ( [facility] 2020 11:07) Final results 
**Test**                                   **Result**      **Flag**    **Units**
      **(Reference)** CBC W/AUTOMATED DIFF     COMPLETE BLOOD COUNT WBC         
                               13.8            H           10/uL         (4.2 - 
11.0) RBC                                        4.69                        
10/uL         (4.50 - 6.30) HEMOGLOBIN                                 13.8     
       L           g/dL            (14.0 - 16.0) HEMATOCRIT                     
            42.2                        %               (41.0 - 51.0) MCV       
                                 90.0                        fL              
(80.0 - 94.0) MCH                                        29.4                   
     pg              (27.0 - 34.0) MCHC                                       
32.7                        g/dL            (31.0 - 36.0) RDW                   
                     13.6                        %               (11.5 - 14.8) 
PLATELETS                                  219                         10/uL    
     (150 - 450) MPV                                        11.3            H   
        fL              (7.4 - 10.4) NEUT                                       
85.0            H           %               (37.0 - 80.0) LYMPH                 
                     8.6             L           %               (25.0 - 40.0) 
MONO                                       5.7                         %        
       (3.0 - 8.0) EOS                                        0.1               
          %               (0.0 - 7.0) BASO                                      
 0.1                         %               (0.0 - 2.0) %IG                    
                    0.5             H           %               (0.0 - 0.0) 
%NRBC                                      0.0                         %        
       (0.0 - 0.0) #NEUT                                      11.74           H 
          10/uL         (2.00 - 6.90) #LYMPH                                    
 1.19                        10/uL         (0.60 - 3.40) #MONO                  
                    0.79                        10/uL         (0.00 - 0.90) #EOS
                                       0.02                        10/uL        
 (0.00 - 0.70) #BASO                                      0.02                  
      10/uL         (0.00 - 0.20) #IG                                        
0.07                        10/uL         (0.00 - 0.10) #NRBC                   
                   0.00                        10/uL         (0.00 - 0.00) 
MANUAL DIFF                                NOT INDICATED RBC MORPH              
                    NOT INDICATEDCMP:   (LINSEY: 2020 10:44)             ( 
MsgRcvd 2020 11:27) Final results **Test**                                
   **Result**      **Flag**    **Units**      **(Reference)** COMPREHENSIVE 
METABOLIC PANEL     COMPREHENSIVE METABOLIC PANEL SODIUM                        
             137                         mEq/L           (134 - 153) POTASSIUM  
                                3.6                         mEq/L           (3.6
 - 5.0) CHLORIDE                                   100                         
mEq/L           (98 - 107) CO2                                        24        
                  MEQ/L           (22 - 30) GLUCOSE                             
       196             H           MG/DL           (65 - 110) BUN               
                         21                          MG/DL           (7 - 21)   
                                                                                
                             4                         Clinical Report - 
Physicians/Mid Levels                                      [facility]                                      Emergency Department              
                [address] [place] [address]                            
    Phone #: [phone]- [phone]                                         
2020 10:02                      
----------------------------------------------------                            
      Patient: SOTO FERNANDEZ                                MRN: [id number]   
   Acct#: [id number]                              Sex: M : 2000 Age: 20y 
CREATININE                                1.0                          MG/DL    
         (0.7 - 1.5) BUN/CREAT                                 21               
                              (8 - 27) TOTAL PROTEIN                            
 6.8                          G/DL              (6.3 - 8.2) ALBUMIN             
                      4.8                          G/DL              (3.9 - 5.0)
 GLOBULIN                                  2.0              L           GM/DL   
          (2.4 - 3.2) A/G RATIO                                 2.4             
 H                             (0.8 - 2.0) CALCIUM                              
     9.2                          MG/DL             (8.4 - 10.2) TOTAL BILI     
                           0.7                          MG/DL             (0.2 -
 1.3) ALKALINE PHOS                             68                           U/L
               (38 - 126) SGOT/AST                                  31          
                 U/L               (5 - 40) SGPT/ALT                            
      24                           U/L               (7 - 56) ANION GAP         
                        13.0                         mmol/L            (8.0 - 
16.0) AGE                                       20                           yrs
 NON-AA GFR                                >60                          mL/min 
AFR AMER GFR                              >60                          mL/min   
                             Male GFR Interprentation                   20-49 
yrs       >60 mL/min   Mdxned38-42 yrs     >56 mL/min   Normal                  
 60-69 yrs     >49 mL/min    Normal                   70-79yrs>42 mL/min   
Normal                   80 and above >35 mL/min     Normal                     
 Female GFRInterpretation                   20-39 yrs      >60 mL/min   Normal  
                  40-49 yrs     >58 mL/minNormal                   50-59 yrs    
 >51 mL/min    Normal                   60-69 yrs      >45 mL/min   Nhnjxe61-60 
yrs     >39 mL/min    Normal                   80 and above >32 mL/min     
NormalLipase:      (LINSEY: 2020 10:44)              ( [facility] 2020 
11:17) Final results **Test**                                  **Result**       
**Flag**     **Units**      **(Reference)** LIPASE                              
      16                            U/L             (13 - 60)PT/PTT:      (LINSEY:
 2020 10:44)              ( [facility] 2020 11:07) Final results 
**Test**                                   **Result**       **Flag**    
**Units**       **(Reference)** PROTIME                                    15.3 
                        SECONDS          (11.0 - 15.5) INR                      
                  1.19                                          (0.93 - 1.23) 
PTT                                        28.1                         SECONDS 
         (24.8 - 36.7)                                 \BLDo\INR 
INTERPRETATION\BLDx\            Therapeutic range for Coumadin andrelated oral 
anticoagulants.            -International Normalized Ratio (INR): 2.0 - 3.0 for 
VenousThrombosis, Pulmonary Embolus, Tissue heart valves, Acute MI           
Atrial Fibrillation, Valvular heart diseaseand recurrent            Systemic 
Embolism.            -International Normalized Ratio (INR): 2.5 - 3.5 
forMechanical            Prosthetic valve.Troponin-T:     (LINSEY: 2020 
10:44)           ( [facility] 2020 11:13) Final results **Test**             
                   **Result**      **Flag**    **Units**          
**(Reference)** TROPONIN T                              <0.01                   
    NG/ML               (0.00 - 0.10) TROPONIN T0.1 ng/ml Recommended as the 
clinical threshold value forTroponin T.Magnesium:     (LINSEY: 2020 10:44)  
          ( [facility] 2020 11:17) Final results **Test**                    
              **Result**       **Flag**     **Units**      **(Reference)** 
MAGNESIUM                                 2.0                           MG/DL   
        (1.7 - 2.2)D-Dimer:      (LINSEY: 2020 10:44)             ( [facility] 
2020 11:07) Final results **Test**                                  
**Result**       **Flag**     **Units**      **(Reference)** D-DIMER QUANT      
                       0.33                          ug/mL           (0.27 - 
0.50)TSH:      (LINSEY: 2020 10:44)         ( [facility] 2020 11:28) 
Final results **Test**                                  **Result**       
**Flag**     **Units**      **(Reference)** TSH                                 
      0.65                          uIU/mL          (0.47 - 5.01)               
                                                                                
                     5                             Clinical Report - 
Physicians/Mid Levels                                           [facility]                                           Emergency Department         
                          [address] [place] [address]                  
                   Phone #: [phone]- [phone]                            
                  2020 10:02                           
----------------------------------------------------                            
           Patient: SOTO FERNANDEZ                                     MRN:
 [id number]      Acct#: [id number]                                   Sex: M : 
2000 Age: 20y  Urinalysis:    (LINSEY: 2020 12:25)             ( 
[facility] 2020 12:37) Final results     **Test**                            
         **Result**       **Flag**   **Units**      **(Reference)**     
URINALYSIS         URINALYSIS     SOURCE                                       R
      COLOR                                       yellow                        
              (NORMAL: Yello      CLARITY                                     
clear                                       (NORMAL: Clear      SPEC GRAVITY    
                            1.020                                       (1.001 -
 1.030      pH                                          5                       
                    (5 - 9)      GLUCOSE                                     100
              A                          (NORMAL: Negat      BILIRUBIN          
                         NEG                                         (NORMAL: 
Negat      KETONE                                      5                A       
                   (NORMAL: Negat      PROTEIN                                  
   30                                          (NORMAL: Negat      NITRITE      
                               NEG                                         
(NORMAL: Negat      BLOOD                                       250             
 A                          (NORMAL: Negat      LEUK EST                        
            NEG                                         (NORMAL: Negat      
UROBILINOGEN                                NOR                                 
        (less than 1.0      MICROSCOPIC                                 See 
Below      RBC                                         15 - 20          A       
                   (NORMAL: NONE      EPITHELIAL                                
  FEW                                         (NORMAL: NONE      MUCOUS         
                             Trace                                       
(NORMAL: NONE  BNP:    (LINSEY: 2020 10:44)           ( [facility] 
11[facility]) Final results     **Test**                                     **Result**
       **Flag**   **Units**      **(Reference)**     BNP                        
                  33                          PG/ML           (0 - 125)  Chest 
Portable 1 View:     (LINSEY: 2020 10:36)                  ( [facility] 
2020 11:30) In Progress  CHEST PORTABLE  Reason(s): syncope  TRANSPORTATI
ON: WC                        IV?         O2?    Oxygen?(No)    Room: 
ED.PROGRESS AND PROCEDURESCourse of Care: NAD, AOx3, interacting well and 
appropriately, no use of accessory muscle, able tospeak full sentences, stable, 
non-toxic looking.  Enter room and pt lying peacefully in bed in NAD. Patient 
stable. Denies any new issues, concerns, or  complaints.  PE demos NV itnact b/l
 UE and LE. No neuro deficits. Pt is alert, but ? post ictal. Pt is able to 
answer  questions appropriatley. Hx given by medic is that there was seizure 
like activity s/p fall. Will obtian labs  and imaging for furhter eval. Pending 
reuslt.   Informed by nurse and attending; pt is having a active seizure. 
Attenidng ordered 2mg ativan. Potential  second dose. Discuss with attending and
 indicaes to order keppra here in ER. 1000mg IV  11:28 20. Reviewed 
montior; noted afib; obtian ECG. Disucssed and rviewed with attending; sts      
                                                                                
                            6                          Clinical Report - 
Physicians/Mid Levels                                       [facility]                                       Emergency Department             
                  [address] [place] [address]                          
       Phone #: [phone]                                        
  2020 10:02                       
----------------------------------------------------                            
       Patient: SOTO FERNANDEZ                                 MRN: [id number] 
     Acct#: [id number]                               Sex: M : 2000 Age: 
20y arrhythemias could be present up to 15mins s/p seizure. Indicates to 
continue to monitor. 12:03 20. Enter room adn pt lying peacefully in bed. 
He is AOx3, but does appear slighly post-ictal. Penidng resutls. 12:58 20.
 Contacted [facility] and discussed with You Nurse Supervisor; takes info and iwll 
call back; calls back and sts that Dr. Stack (hosptialist) requests Neuro 
consult as this is first seizure. Called [facility] and discusss with Dr. Staples [sp] 
and indcates that hosptialist will be admitting and will do EEG and MRI in the 
AM. continue wiht Keppra 500mg BID. Call You and inform. Sts will call back. 
Informed that pt is accepted and has room assignment. Critical care performed 
(60 minutes). Time includes: direct patient care, patient reassessment, 
coordination of patient care, review of patient's medical records, medical 
consultation, family consultation regarding treatment decisions and 
documentation of patient care- see progress notes. Discussed case with health 
care provider (Cesar). Disposition: Transferred to [facility]. 
UTI (catheter associated) was not present prior to transfer. Pressure ulcer was 
not present prior to transfer. Vascular infection (catheter associated) was not 
present prior to transfer.CLINICAL IMPRESSION New onset generalized seizure. No 
history of epilepsy.(Electronically signed by Eligio Marlow P.A.-C 
2020 19:20)  









          Name      Value     Range     Interpretation Code Description Data Anabella

rce(s) Supporting 

Document(s)

 

                                                                       









                    ID                  Date                Data Source

 

                    [id number]     2020 01:14:00 PM EDT [facility]









          Name      Value     Range     Interpretation Code Description Data Anabella

rce(s) Supporting 

Document(s)

 

          DRUG SCREEN URINE                                         [facility]  

 

                                            URINE DRUG SCREEN 

 

           Amphetamine [Presence] in Urine by Screen method NEGATIVE   NORMAL: N

EGATIVE                       

[facility]                   

 

          BARBITURATES NEGATIVE  NORMAL: NEGATIVE                     [facility]  

 

          BENZO     NEGATIVE  NORMAL: NEGATIVE                     [facility]  

 

          COCAINE   NEGATIVE  NORMAL: NEGATIVE                     [facility]  

 

           Tetrahydrocannabinol [Presence] in Urine NEGATIVE   NORMAL: NEGATIVE 

                      [facility]                            

 

          OPIATES   NEGATIVE  NORMAL: NEGATIVE                     [facility]  

 

             Phencyclidine [Presence] in Urine by Screen method NEGATIVE     NOR

MAL: NEGATIVE               

                          [facility]     

 

                                                            \BLDo\URINE DRUG SCR

EEN INTERPRETATION\BLDx\                

THE CUTOFFF LEVELS FOR DETECTION ARE AS FOLLOWS:                    AMPHETAMINES
                    1000 ng/ml                    BARBITUARATES                 
   200 ng/ml                    BENZODIAZEPINES                  100 ng/ml      
              THC                               50 ng/ml                    
PHENCYCLIDINE                     25 ng/ml                    OPIATES           
               300 ng/ml                    COCAINE                          300
 ng/ml                  ALL POSITIVES ARE CONSIDERED PRESUMPTIVE POSITIVE       
          CONFIRMATION WILL BE PERFORMED AT PHYSICIAN REQUEST. 









                    ID                  Date                Data Source

 

                    [id number]     2020 12:37:00 PM EDT [facility]









          Name      Value     Range     Interpretation Code Description Data Anabella

rce(s) Supporting 

Document(s)

 

          URINALYSIS                                         [facility]  

 

                                            URINALYSIS 

 

          SOURCE    R                                       [facility]

al  

 

          COLOR     yellow    NORMAL: Yellow                     [facility]

ospital  

 

          CLARITY   clear     NORMAL: Clear                     [facility]

spital  

 

           Specific gravity of Urine by Test strip 1.020      1.001 - 1.030     

                  [facility]                                 

 

          pH        5         5 - 9                         [facility]

al  

 

           Glucose [Mass/volume] in Urine by Test strip 100        NORMAL: Negat

[facility]                            

 

           Bilirubin.total [Presence] in Urine by Test strip NEG        NORMAL: 

Negative                       

[facility]                   

 

           Ketones [Presence] in Urine by Test strip 5          NORMAL: Negative

 [facility]                                 

 

           Protein [Mass/volume] in Urine by Test strip 30         NORMAL: Negat

[facility]                            

 

           Nitrite [Presence] in Urine by Test strip NEG        NORMAL: Negative

                       [facility]                                

 

          BLOOD     250       NORMAL: Negative [facility]  

 

           Leukocyte esterase [Presence] in Urine by Test strip NEG        KLAUDIA

L: Negative                       

[facility]                   

 

           Urobilinogen [Mass/volume] in Urine by Test strip NOR        less behzad

n 1.0 mg/dL                       

[facility]                   

 

          MICROSCOPIC See Below                               [facility]  

 

           Erythrocytes [#/volume] in Urine by Test strip 15 - 20    NORMAL: NON

E SEEN [facility]                   

 

          EPITHELIAL FEW       NORMAL: NONE SEEN                     [facility]  

 

                Mucus [Presence] in Urine sediment by Light microscopy Trace    

       NORMAL: NONE SEEN  

                                        [facility]  









                    ID                  Date                Data Source

 

                    [id number]     2020 11:28:00 AM EDT [facility]









          Name      Value     Range     Interpretation Code Description Data Anabella

rce(s) Supporting 

Document(s)

 

          BNP       33 PG/ML  0 - 125                       [facility]

al  









                    ID                  Date                Data Source

 

                    [id number]     2020 11:28:00 AM EDT [facility]









          Name      Value     Range     Interpretation Code Description Data Anabella

rce(s) Supporting 

Document(s)

 

                          Thyrotropin [Units/volume] in Serum or Plasma by Detec

tion limit <= 0.05 mIU/L 

0.65 uIU/mL  0.47 - 5.01                            [facility]  









                    ID                  Date                Data Source

 

                    [id number]     2020 11:27:00 AM EDT [facility]









          Name      Value     Range     Interpretation Code Description Data Anabella

rce(s) Supporting 

Document(s)

 

                Creatine kinase [Enzymatic activity/volume] in Serum or Plasma 3

33 U/L         30 - 170        

H                                       [facility]  









                    ID                  Date                Data Source

 

                    [id number]     2020 11:27:00 AM EDT [facility]









          Name      Value     Range     Interpretation Code Description Data Anabella

rce(s) Supporting 

Document(s)

 

          SALICYLATE <0.3 mg/dL 2.0 - 20.0 L                   [facility]

pital  









                    ID                  Date                Data Source

 

                    [id number]     2020 11:27:00 AM EDT [facility]      Value     Range     Interpretation Code Description Data Anabella

rce(s) Supporting 

Document(s)

 

          COMPREHENSIVE METABOLIC PANEL                                         

[facility]  

 

                                            COMPREHENSIVE METABOLIC PANEL 

 

           Sodium [Moles/volume] in Serum or Plasma 137 mEq/L  134 - 153        

                [facility]                                 

 

           Potassium [Moles/volume] in Serum or Plasma 3.6 mEq/L  3.6 - 5.0     

                   [facility]                            

 

           Chloride [Moles/volume] in Serum or Plasma 100 mEq/L  98 - 107       

                  [facility]                                 

 

           Carbon dioxide, total [Moles/volume] in Serum or Plasma 24 MEQ/L   22

 - 30                          

[facility]                   

 

           Glucose [Mass/volume] in Serum or Plasma 196 MG/DL  65 - 110   H     

                [facility]                                 

 

          BUN       21 MG/DL  7 - 21                        [facility]

al  

 

           Creatinine [Mass/volume] in Serum or Plasma 1.0 MG/DL  0.7 - 1.5     

                   [facility]                            

 

          BUN/CREAT 21        8 - 27                        [facility]

al  

 

           Protein [Mass/volume] in Serum or Plasma 6.8 G/DL   6.3 - 8.2        

                [facility]                                 

 

           Albumin [Mass/volume] in Serum or Plasma 4.8 G/DL   3.9 - 5.0        

                [facility]                                 

 

           Globulin [Mass/volume] in Serum by calculation 2.0 GM/DL  2.4 - 3.2  

L                     [facility]                            

 

          A/G RATIO 2.4       0.8 - 2.0 H                   [facility]  

 

           Calcium [Mass/volume] in Serum or Plasma 9.2 MG/DL  8.4 - 10.2       

                [facility]                                 

 

           Bilirubin.total [Mass/volume] in Serum or Plasma 0.7 MG/DL  0.2 - 1.3

                        

[facility]                   

 

                    Alkaline phosphatase [Enzymatic activity/volume] in Serum or

 Plasma 68 U/L              38 - 

126                                             [facility]  

 

                          Aspartate aminotransferase [Enzymatic activity/volume]

 in Serum or Plasma 31 U/L

             5 - 40                                 [facility]  

 

                    Alanine aminotransferase [Enzymatic activity/volume] in Seru

m or Plasma 24 U/L              7

- 56                                            [facility]  

 

           Anion gap 3 in Serum or Plasma 13.0 mmol/L 8.0 - 16.0                

       [facility]

                                         

 

          AGE       20 yrs                                  [facility]

al  

 

          NON-AA GFR >60 mL/min                               [facility]

ital  

 

          AFR AMER GFR >60 mL/min                               [facility]

spital  

 

                                                                     Male GFR In

terprentation                  20-49 yrs

    >60 mL/min   Normal                  50-59 yrs     >56 mL/min   Normal      
           60-69 yrs     >49 mL/min   Normal                  70-79yrs      >42 
mL/min   Normal                  80 and above  >35 mL/min   Normal              
     Female GFR  Interpretation                  20-39 yrs     >60 mL/min   
Normal                  40-49 yrs     >58 mL/min   Normal                  50-59
yrs     >51 mL/min   Normal                  60-69 yrs     >45 mL/min   Normal  
               70-79 yrs     >39 mL/min   Normal                  80 and above  
>32 mL/min   Normal 









                    ID                  Date                Data Source

 

                    [id number]     2020 11:17:00 AM EDT [facility]









          Name      Value     Range     Interpretation Code Description Data Anabella

rce(s) Supporting 

Document(s)

 

           Acetaminophen [Presence] in Urine <5.0 UG/ML 0.0 - 30.0              

         [facility]                                 









                    ID                  Date                Data Source

 

                    [id number]     2020 11:17:00 AM EDT [facility]









          Name      Value     Range     Interpretation Code Description Data Anabella

rce(s) Supporting 

Document(s)

 

           Magnesium [Mass/volume] in Serum or Plasma 2.0 MG/DL  1.7 - 2.2      

                  [facility]                                









                    ID                  Date                Data Source

 

                    [id number]     2020 11:17:00 AM EDT [facility]









          Name      Value     Range     Interpretation Code Description Data Anabella

rce(s) Supporting 

Document(s)

 

           Lipase [Enzymatic activity/volume] in Serum or Plasma 16 U/L     13 -

 60                          

[facility]                   









                    ID                  Date                Data Source

 

                    [id number]     2020 11:13:00 AM EDT [facility]









          Name      Value     Range     Interpretation Code Description Data Anabella

rce(s) Supporting 

Document(s)

 

          TROPONIN T <0.01 NG/ML 0.00 - 0.10                     [facility]

ospital  

 

                                        TROPONIN T0.1 ng/ml Recommended as the c

linical threshold value forTroponin T. 









                    ID                  Date                Data Source

 

                    [id number]     2020 11:07:00 AM T [facility]









          Name      Value     Range     Interpretation Code Description Data Anabella

rce(s) Supporting 

Document(s)

 

                Fibrin D-dimer FEU [Mass/volume] in Platelet poor plasma 0.33 ug

/mL      0.27 - 0.50     

                                        [facility]  









                    ID                  Date                Data Source

 

                    [id number]     2020 11:07:00 AM EDT [facility]









          Name      Value     Range     Interpretation Code Description Data Anabella

rce(s) Supporting 

Document(s)

 

          Prothrombin time (PT) 15.3 SECONDS 11.0 - 15.5                     [facility]  

 

           INR in Platelet poor plasma by Coagulation assay 1.19       0.93 - 1.

23                       [facility]                            

 

           aPTT in Blood by Coagulation assay 28.1 SECONDS 24.8 - 36.7          

             [facility]                                 

 

                                                                      \BLDo\INR 

INTERPRETATION\BLDx\          

Therapeutic range for Coumadin and related oral anticoagulants.          -
International Normalized Ratio (INR): 2.0 - 3.0 for Venous           Thrombosis,
 Pulmonary Embolus, Tissue heart valves, Acute MI           Atrial Fibrillation,
 Valvular heart disease and recurrent           Systemic Embolism.          -
International Normalized Ratio (INR): 2.5 - 3.5 for Mechanical           
Prosthetic valve. 









                    ID                  Date                Data Source

 

                    [id number]     2020 11:07:00 AM EDT [facility]









          Name      Value     Range     Interpretation Code Description Data Anabella

rce(s) Supporting 

Document(s)

 

          CBC W/AUTOMATED DIFF                                         [facility]  

 

                                            COMPLETE BLOOD COUNT 

 

           Leukocytes [#/volume] in Blood by Automated count 13.8 10^3/uL 4.2 - 

11.0 H                     

[facility]                   

 

             Erythrocytes [#/volume] in Blood by Automated count 4.69 10^6/uL 4.

50 - 6.30                

                          [facility]     

 

           Hemoglobin [Mass/volume] in Blood 13.8 g/dL  14.0 - 16.0 L           

          [facility]                                 

 

           Hematocrit [Volume Fraction] of Blood by Automated count 42.2 %     4

1.0 - 51.0                       

[facility]                   

 

                    Erythrocyte mean corpuscular volume [Entitic volume] by Auto

mated count 90.0 fL             

80.0 - 94.0                                     [facility]  

 

                          Erythrocyte mean corpuscular hemoglobin [Entitic mass]

 by Automated count 29.4 

pg           27.0 - 34.0                            [facility]  

 

                                        Erythrocyte mean corpuscular hemoglobin 

concentration [Mass/volume] by Automated

 count     32.7 g/dL  31.0 - 36.0                       [facility]  

 

             Erythrocyte distribution width [Ratio] by Automated count 13.6 %   

    11.5 - 14.8                

                          [facility]     

 

           Platelets [#/volume] in Blood by Automated count 219 10^3/uL 150 - 45

0                        

[facility]                   

 

                    Platelet mean volume [Entitic volume] in Blood by Automated 

count 11.3 fL             7.4 - 

10.4            H                               [facility]  

 

           Neutrophils/100 leukocytes in Blood by Automated count 85.0 %     37.

0 - 80.0 H                     

[facility]                   

 

           Lymphocytes/100 leukocytes in Blood by Manual count 8.6 %      25.0 -

 40.0 L                     

[facility]                   

 

           Monocytes/100 leukocytes in Blood by Automated count 5.7 %      3.0 -

 8.0                        

[facility]                   

 

           Eosinophils/100 leukocytes in Blood by Automated count 0.1 %      0.0

 - 7.0                        

[facility]                   

 

           Basophils/100 leukocytes in Blood by Automated count 0.1 %      0.0 -

 2.0                        

[facility]                   

 

          %IG       0.5 %     0.0 - 0.0 H                   [facility]

al  

 

          %NRBC     0.0 %     0.0 - 0.0                     [facility]

al  

 

             Neutrophils [#/volume] in Blood by Automated count 11.74 10^3/uL 2.

00 - 6.90  H             

                          [facility]     

 

           Lymphocytes [#/volume] in Blood by Automated count 1.19 10^3/uL 0.60 

- 3.40                       

[facility]                   

 

           Monocytes [#/volume] in Blood by Automated count 0.79 10^3/uL 0.00 - 

0.90                       

[facility]                   

 

           Eosinophils [#/volume] in Blood by Automated count 0.02 10^3/uL 0.00 

- 0.70                       

[facility]                   

 

           Basophils [#/volume] in Blood by Automated count 0.02 10^3/uL 0.00 - 

0.20                       

[facility]                   

 

          #IG       0.07 10^3/uL 0.00 - 0.10                     [facility]

ospital  

 

          #NRBC     0.00 10^3/uL 0.00 - 0.00                     [facility]

ospital  

 

          MANUAL DIFF NOT INDICATED                               [facility]  

 

          RBC MORPH NOT INDICATED                               [facility]

spital  







                                        Procedure

 

                                          



                                                                                
                                                                                
                                                                                
                                                                                
                  



Vital Signs

          



                    ID                  Date                Data Source

 

                    UNK                                      









           Name       Value      Range      Interpretation Code Description Data

 Source(s)

 

           Body mass index (BMI) [Ratio] 20.2 kg/m2                       20.2 k

g/m2 [facility] ([facility])

 

           Body weight 157.00 [lb_av]                       157.00 [lb_av] JORGE LUIS

T ([facility])

 

           Body height 74 [in_i]                        74 [in_i]  [facility] ([facility])

 

                                        6'2" 

 

           Respiratory rate 14 /min                          14 /min    [facility] (

[facility])

 

           Heart rate 74 /min                          74 /min    [facility] ([facility])

 

           Diastolic blood pressure 70 mm[Hg]                        70 mm[Hg]  

[facility] ([facility])

 

           Systolic blood pressure 120 mm[Hg]                       120 mm[Hg] HERMELINDO MALAGON ([facility])

## 2021-01-16 VITALS — DIASTOLIC BLOOD PRESSURE: 60 MMHG | SYSTOLIC BLOOD PRESSURE: 106 MMHG

## 2021-01-16 VITALS — SYSTOLIC BLOOD PRESSURE: 114 MMHG | DIASTOLIC BLOOD PRESSURE: 62 MMHG

## 2021-01-16 NOTE — HPEPDOC
[facility] Medical History & Physical


Date of Admission


Laureano 15, 2021


Date of Service:  Jan 16, 2021





History and Physical


Chief complaint:


Presented to the ER with suicidal ideation





History of present illness:


Patient is a 20-year-old  male with no significant past medical history

who presented to emergency room with suicidal ideation. He was admitted to the 

inpatient mental health unit under the care of psychiatry. Hospitalist service 

was consulted for medical screening evaluation.





Currently patient denies any headache, nausea, vomiting, chest pain, shortness 

of breath, palpitations, constipation, diarrhea, or urinary discomfort. Denies 

any recent fevers or chills. Reports that his left arm is a little sore from 

prior flu shot that he received approximately 3 days.





Patient reports that his weight and appetite have been relatively unchanged.





Past Medical History:


Single seizure episode that has been worked up by neurology and patient is now 

off of Keppra





Past Surgical History:


Right hand fracture status post casting





Allergies:


See below 





Medications:


See below





Family History:


- Maternal grandmother with a history of lung cancer





Social History: 


- Denies the use of alcohol or illicit drugs; patient reports that he smokes 

cigarettes; 1 pack over the duration of the week


- Denies recent travel or sick contacts


- Lives on [place] 


- Occupation;  





Review of Systems:


10 point review of systems complete, all negative otherwise stated in HPI





Physical exam:


- Vitals: BP [114/62], HR [54], RR [18], Sat [99%RA], Temp [97.4F]


- General: Lying in bed, Speaking in full sentences, AAOx3


- HEENT: NC, AT, PERRLA


- CVS: RRR, +S1S2


- Lungs: Fair air entry bilaterally, No appreciable wheezing / rales / rhonchi 


- Abdomen: Soft, Non-distended, Non-tender


- Extremities: No lower extremity edema, No calf tenderness


- Neuro: No focal motor or sensory deficit


- Skin: No visible rashes 





Labs:


See below 





Imaging:


See below





EKG: 


See below





Assessment and Plan: 


Suicidal ideation


- Patient was admitted to the inpatient mental health unit under the care of 

psychiatry


- Currently being managed by psychiatry





Remote seizures


- Single episode 1 year ago


- Has been worked up by neurology; Keppra was discontinued





DVT prophylaxis 


- Will continue with ambulation





Thank you for this consultation; hospital service will now sign off any: Please 

reconsult as needed





Vital Signs





Vital Signs








  Date Time  Temp Pulse Resp B/P (MAP) Pulse Ox O2 Delivery O2 Flow Rate FiO2


 


1/16/21 10:48      Room Air  


 


1/16/21 01:07 97.4 54 18 114/62 (79) 99   











Laboratory Data


Microbiology





Microbiology


1/15/21 Respiratory Virus Panel (PCR) (DOTTY) - Final, Complete





Home Medications


No Active Prescriptions or Reported Meds





Allergies


Coded Allergies:  


     No Known Allergies (Unverified , 6/17/20)











SARAH HIGGINS MD                Jan 16, 2021 17:03

## 2021-01-17 VITALS — SYSTOLIC BLOOD PRESSURE: 113 MMHG | DIASTOLIC BLOOD PRESSURE: 57 MMHG

## 2021-01-17 RX ADMIN — TRAZODONE HYDROCHLORIDE PRN MG: 50 TABLET ORAL at 21:26

## 2021-01-17 NOTE — WAPSY-INT
[facility] PSYCH INITIAL ASSESSMENT



 

DATE OF ADMISSION:  01/15/2021



VITAL SIGNS: Blood pressure 132/70, pulse 16, temperature 97.8.



CHIEF COMPLAINT:  He has tried killing himself.  Feels depressed.



This is a video assessment.  He is waiting to go upstairs for admission to

inpatient psychiatry unit.  He is in the ER.



SUBJECTIVE:  He is 30 years old.  He is single.  He is active duty.  He is

brought in as he has been feeling increasingly distressed, to the point where

he tried killing himself, cut the right side of the neck with a knife and also

took some ibuprofen to speed up blood flow.  Afterwards, he finally called the

[place] and the  police were informed.



He spoke of being distressed for several years and thinks it built up and

suggests he does not think he was suicidal, but wanted to make a statement

similar to the [place] manner of killing himself to express his despair at the

state of society.  He says he always felt that he carries the weight and

burdens of the world, metaphorically.  Has felt that for many years and that it

has been more distressing over the course of the last year, more so recently.  



Says worried a lot of matters generally in society, including what he terms as

hate that people have for each other.  Says always tried to do his best to help

alleviate some of that and that he wants to help others.  Says tended to do

that from a young age.  Says matters intensified over the course of the last

year, particularly with the pandemic, the virus, where he has had a further

sense of despair.  



He is unclear if he wishes that he were dead, but has felt increasingly

hopeless.  Said has tried getting rid of this feeling of carrying the world's

burden, but without much success, and felt that if he , it would be a

statement as to the sense of his despair.  He is not sure if he wanted to kill

himself.  He denies that he feels depressed, however.  Sleep is more difficult,

somewhat diminished.  He feels more tired, finds it harder to concentrate.



No history consistent with hypomania or breana as far as I am aware, nor of any

overt psychosis.



No history of posttraumatic stress disorder.



PAST PSYCHIATRIC HISTORY:  Essentially none formerly.  No history of suicidal

attempts or inpatient psychiatric hospitalizations as far as I am aware.



FAMILY PSYCHIATRIC HISTORY:  Unknown.  



SUBSTANCE ABUSE HISTORY:  None significantly.



MEDICAL HISTORY:  No acute medical concerns.



SOCIAL HISTORY:  He did not go into details, but suggests has family in

[place].  



He is in the , no deployments as far as I am aware.  Says he joined the

 hoping that he would make a difference regarding the suffering of

others.



MENTAL STATUS EXAMINATION:

He is tall.  He is in hospital clothes.  He is neat.  Guarded, but a bit more

relaxed as the interview proceeded.  No agitation.  No psychomotor retardation.

 Coherent.  At times a bit tangential, but directable.  Affect is restricted in

range.  Vague on active suicidal thoughts.  No homicidal ideas or intents. 

Does not appear internally preoccupied.  No delusional ideations elicited.  No

fluctuation of consciousness.  Intellect average.  Judgment and insight are

quite compromised.



ASSESSMENT:

1.  Major depressive disorder, single episode, severe, without psychotic

features.

2.  Possibility of bipolar disorder also needs to be considered.

3.  The pandemic and his concerns about it and the response, which he feels has

been exaggerated.



He is clinically severely depressed, despondent with the state of the world,

the country, more so over the course of the last year, the pandemic, concerned

about the response to it, which he feels has been exaggerated and further

burdened by world affairs. 



Unclear if this reaches psychotic proportions.



PLAN:  He is admitted to inpatient psychiatry and placed on relevant

precautions.  We will look at obtaining collateral information.  He will

receive a medicine consultation if indicated.  He will be encouraged to

participate in the activities in the activities there.



I would suggest that he consider using an antidepressant, the value of doing so

is discussed, as well as the drawbacks.  He says he feels he would not have a

choice.  He is informed that he does.  He says he will think about it.  



He will be discharged with followup once he is stable.  I anticipate a five to

seven day stay.



The assessment took 50 minutes.

OMAR

## 2021-01-18 VITALS — DIASTOLIC BLOOD PRESSURE: 51 MMHG | SYSTOLIC BLOOD PRESSURE: 100 MMHG

## 2021-01-18 VITALS — DIASTOLIC BLOOD PRESSURE: 72 MMHG | SYSTOLIC BLOOD PRESSURE: 124 MMHG

## 2021-01-18 RX ADMIN — TRAZODONE HYDROCHLORIDE PRN MG: 50 TABLET ORAL at 20:23

## 2021-01-18 NOTE — MHIPN
[facility] PROGRESS NOTE



DATE:  01/16/2021



VITAL SIGNS:  Blood pressure 114/52, pulse 54, temperature 97.4.



CHIEF COMPLAINT:  Says feels a bit rested.



SUBJECTIVE:  Seen for followup, is seen on video, in the presence of staff.  I

had seen him in the emergency room, for the initial evaluation, on video, due

to the pandemic.  



Feels a bit better, says is a bit rested, and that he had better sleep.  Did

not have breaks within his sleep.



Says feels essentially the same, but a little less intense.



He says he feels he needs a little time, better sleep, and denies feeling

depressed, feels "bogged down."



MENTAL STATUS EXAMINATION:  He is neat, he is cooperative.  There is no

agitation, no psychomotor retardation, appears a bit more relaxed than

yesterday, with a slightly broader affect.  He is coherent, less tangential. 

Denies feeling suicidal, no homicidal ideas or intents, currently no evidence

of psychosis.  Cognition grossly intact, judgment poor, insight is compromised.



ASSESSMENT:  

Major depressive disorder, severe.  

Appears depressed.



PLAN:  I would suggest he consider using an antidepressant, he declines that,

but says will think about it, suggests that he would probably require some

rest, I feel that is reasonable, he wants to see how things develop after that.



We spoke of how clinically significant depression appears as a feeling of

emotional tiredness quite often, and not necessarily a depressed mood.



It is encouraging that he slept well.



We will look at obtaining collateral information.



He says he will think about using an antidepressant, we will encourage him for

that. 



We will continue with the present observations.  



Further recommendations will be made depending on the clinical picture.

## 2021-01-18 NOTE — WAPSY-FUP
[facility] PSYCH FOLLOWUP ASSESSMENT





DATE OF ADMISSION:  01/15/2021



VITAL SIGNS:  Blood pressure 113/57, pulse 71, temperature 98.1.



CHIEF COMPLAINT:  He says feels more rested.



SUBJECTIVE:  Seen for followup.  The assessment was done via video.  Says feels

better, rested, but that the trazodone made him quite drowsy, feels he slept

for more than 12 hours.  Slept well, got up later today, went back to sleep

again.  Says feels better overall emotionally.  



He wonders whether the trazodone could be cut, the dose.



He spoke of difficulty with sleep, the last few years, where he does not sleep

much, it diminishes, but that he remains focused, possibly extra focused on

what he is attending to, may go on to other things, but does not think that he

overdoes it and denies an elated mood at that time, excessive irritability. He

does not think he is impulsive, but more engaged in what he does during those

periods.



Says is most concerned about matters which may burden him, when he is not

sleeping.



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

, and that he does well, feels a bit more challenged just lately, when

difficulty with sleep worsens, and he felt more concerned about societal

burdens.



Says alcohol and drugs are not an issue, and never have been. 



Indicates his mother has had emotional difficulties, particularly after a head

injury when she was a child.  



Says his maternal grandmother killed herself after she was diagnosed with

cancer.  He is not aware of any other concerns within the family.



MENTAL STATUS EXAMINATION:   

He is neat.  He is cooperative.  Appears more rested.  No agitation.  No

psychomotor retardation.  Affect somewhat broader, appears more relaxed. 

Speech is a bit overproductive, but not overly so.  Denies any thoughts of

harming himself or anyone else.  No evidence of any psychosis.  Cognition

grossly intact.  Judgment is fair, possibly a bit improved.  Insight fair.



ASSESSMENT:   Major depressive disorder.



I think this still remains the working diagnosis, but with his periods of lack

of sleep, and absence of elated or extra irritable mood, bipolar disorder may

be less likely, but this cannot be completely ruled out.  



PLAN:  I suggest that the trazodone is decreased to 25 mg at night as needed. 

We will hold off on using an antidepressant, as this warrants further

evaluation, the possibility of a mood stabilizer ought to be considered as

well.  



He is to participate in activities in the unit as tolerated.



He has a primary care clinician at [facility] and I would suggest the patient be

considered for a sleep study, to rule out any primary sleep disorder, which can

be done as an outpatient.



Other recommendations will be made depending on the clinical picture.  He will

see the assigned clinician tomorrow.

## 2021-01-19 VITALS — DIASTOLIC BLOOD PRESSURE: 60 MMHG | SYSTOLIC BLOOD PRESSURE: 133 MMHG

## 2021-01-19 VITALS — SYSTOLIC BLOOD PRESSURE: 116 MMHG | DIASTOLIC BLOOD PRESSURE: 55 MMHG

## 2021-01-19 RX ADMIN — TRAZODONE HYDROCHLORIDE PRN MG: 50 TABLET ORAL at 22:34

## 2021-01-19 NOTE — MHIPN
[facility] PRO[facility]ESS [facility]



DATE:  01/18/2021



VITAL SIGNS: Blood pressure 124/72, pulse 58, temperature 98.7. 



CHIEF COMPLAINT: Feels better. 



SUBJECTIVE:  Seen by video, in the presence of staff. 



He says he feels better and that he has been feeling as he sleeps well. Slept

well yesterday, moods are much improved, says his thinking is clearer, he has

been eating well. No agitation. 



Says looks at the events, thoughts, of late last week, just before he came in,

as a haze, says finds it difficult to recognize that. 



Denies any thoughts of harming himself. 



MENTAL STATUS EXAMINATION: He is neat. He is cooperative. No agitation. No

psychomotor retardation. He is coherent. Affect is reactive, broad. Speech

normal in amount and rate, not as quick as previously. 



He has a broader affect. He denies any suicidal thoughts or intent. No

homicidal ideation or intent. Currently no evidence of any psychosis. Cognition

is grossly intact. Judgment improved. Insight somewhat improved as well. 



ASSESSMENT:

1.  Major depressive disorder. 

2.  Rule out bipolar disorder. 



Clinically improved, less distressed, and improved sleep has tended to help.



PLAN:

1.  Continue trazodone 25 mg at night as needed. 

2.  Will again hold off on using an antidepressant, or a mood stabilizer, but

    will look at obtaining collateral information first, to obtain a bundy

    clinical picture. 

3.  He has been encouraged to participate in activities as indicated. 

4.  Further recommendations will be made depending on his clinical picture, we

    will continue with discharge planning as well.

## 2021-01-20 VITALS — DIASTOLIC BLOOD PRESSURE: 64 MMHG | SYSTOLIC BLOOD PRESSURE: 120 MMHG

## 2021-01-20 VITALS — DIASTOLIC BLOOD PRESSURE: 56 MMHG | SYSTOLIC BLOOD PRESSURE: 110 MMHG

## 2021-01-20 RX ADMIN — TRAZODONE HYDROCHLORIDE SCH MG: 50 TABLET ORAL at 22:15

## 2021-01-20 NOTE — MHIPNPDOC
[facility] Progress Note


Progress Note


Subjective:





Yonny is a 20 year old  male with an unclear past psychiatric history

who was admitted to [facility] service after a self-injurious 

episode following three days of sleep deprivation.  Yonny now states that 

since getting eight hours of sleep regularly for the past three nights, he is 

feeling incredibly lucid and clear.  I just needed to sleep; I kept staying up 

to talk to people and help them but now I know how important my sleep is to my 

health.  He has been taking Trazodone 25 mg at bedtime with good effect; states

he has been waking up feeling rested; appetite improved, mood is great.  I 

just want to get back with my friends in [place].  I miss them.  Adamantly 

denies any current passive or active suicidal or homicidal ideation, intent or 

plan.  Denies any other complaints.





Objective:





Seen and evaluated, alert and oriented times three, tall stature, thin body 

habitus, appears stated age, good hygiene, wearing casual clothes, good eye 

contact;  Speech is normal in production, rate and volume.  Mood:  much 

better, affect: bright/congruent;  Denies any current passive or active 

suicidal or homicidal ideation, intent or plan.  Denies any AH/VH/TH;  denies 

any delusional themes;  insight, impulse control and judgment are improved.





A/P:





Yonny is a 20 year old  male with an unclear past psychiatric history

who was admitted to [facility] service after a self-injurious 

episode following three days of sleep deprivation.  





1)   Continue Trazodone at 25 mg po qhs for insomnia; good tolerability and 

efficacy.


2)   Continue to work with discharge planners and treatment team for safest 

discharge plan available, which likely will be a return to the [facility] 

with appropriate aftercare in place.


3)   Continue to participate in community milieu and group programming on the 

unit.





Vital Signs





Vital Signs








  Date Time  Temp Pulse Resp B/P (MAP) Pulse Ox O2 Delivery O2 Flow Rate FiO2


 


1/20/21 16:32 98.8 56 16 110/56 (74) 100 Room Air  











Current Medications





Current Medications








 Medications


  (Trade)  Dose


 Ordered  Sig/Lesvia


 Route


 PRN Reason  Start Time


 Stop Time Status Last Admin


Dose Admin


 


 Acetaminophen


  (Tylenol Tab)  650 mg  Q6HP  PRN


 PO


 HEADACHE or DISCOMFORT  1/15/21 21:30


     





 


 Al Hydrox/Mg


 Hydrox/Simethicone


  (Mylanta)  30 ml  Q4HP  PRN


 PO


 HEARTBURN/INDIGESTION  1/15/21 21:30


     





 


 Home Med


  (Med Rec


 Complete!)    ASDIRECTED


 XX


   1/15/21 12:00


 1/15/21 11:58 DC  





 


 Magnesium


 Hydroxide


  (Milk Of


 Magnesia)  30 ml  DAILYPRN  PRN


 PO


 CONSTIPATION  1/15/21 21:30


     





 


 Olanzapine


  (ZyPREXA


 **ZYDIS**)  5 mg  Q4HP  PRN


 PO


 anixety/agitation  1/15/21 23:30


     





 


 Trazodone HCl


  (Desyrel)  25 mg  QHS


 PO


   1/20/21 21:00


     





 


 Trazodone HCl


  (Desyrel)  25 mg  QHSP  PRN


 PO


 INSOMNIA  1/17/21 14:00


 1/20/21 18:08 DC 1/19/21 22:34





 


 Trazodone HCl


  (Desyrel)  50 mg  QHSP  PRN


 PO


 INSOMNIA  1/15/21 21:30


 1/17/21 13:54 DC 1/16/21 22:00














Allergies


Coded Allergies:  


     No Known Allergies (Unverified , 6/17/20)











CHELSEA ALY MD        Jan 20, 2021 19:36

## 2021-01-21 VITALS — DIASTOLIC BLOOD PRESSURE: 66 MMHG | SYSTOLIC BLOOD PRESSURE: 121 MMHG

## 2021-01-21 VITALS — DIASTOLIC BLOOD PRESSURE: 54 MMHG | SYSTOLIC BLOOD PRESSURE: 109 MMHG

## 2021-01-21 RX ADMIN — TRAZODONE HYDROCHLORIDE SCH MG: 50 TABLET ORAL at 22:51

## 2021-01-21 NOTE — MHIPNPDOC
[facility] Progress Note


Progress Note


Subjective:





Yonny is a 20 year old  male with an unclear past psychiatric history

who was admitted to [facility] service after a self-injurious 

episode following three days of sleep deprivation.  Im ready to go home Gela Rodriges been sleeping so much better and feel like myself again.  Sleep was again 

restful, appetite improved;  visible in community;  denies any negativistic 

thinking;  denies any current passive or active suicidal or homicidal ideation, 

intent or plan.  Staff agrees that patient is back to his baseline and is ready 

for discharge.





Objective:





Seen and evaluated, alert and oriented times three, tall stature, thin body 

habitus, appears stated age, good hygiene, wearing casual clothes, good eye 

contact;  Speech is normal in production, rate and soft in volume.  Mood: ready

to finally get out of here, affect: pleasant;  TP are circumstantial but for 

the most part, goal directed.  Denies any current passive or active suicidal or 

homicidal ideation, intent or plan.  Denies any AH/VH/TH;  denies any delusional

themes;  insight, impulse control and judgment are improved.





A/P:





Yonny is a 20 year old  male with an unclear past psychiatric history

who was admitted to [facility] service after a self-injurious 

episode following three days of sleep deprivation.  





1)   Continue Trazodone at 25 mg po qhs for insomnia; good tolerability and 

efficacy.


2)   Continue to work with discharge planners and treatment team for safest 

discharge plan available, which likely will be a return to the [facility] 

with appropriate aftercare in place, which may very likely occur tomorrow.


3)   Continue to participate in community milieu and group programming on the 

unit.


4) Extensive psychoeducation given in terms of all risks, benefits and 

alternatives to all modalities of treatment.





Vital Signs





Vital Signs








  Date Time  Temp Pulse Resp B/P (MAP) Pulse Ox O2 Delivery O2 Flow Rate FiO2


 


1/21/21 18:02 98.7 52 16 121/66 (84) 100   


 


1/20/21 16:32      Room Air  











Current Medications





Current Medications








 Medications


  (Trade)  Dose


 Ordered  Sig/Lesvia


 Route


 PRN Reason  Start Time


 Stop Time Status Last Admin


Dose Admin


 


 Acetaminophen


  (Tylenol Tab)  650 mg  Q6HP  PRN


 PO


 HEADACHE or DISCOMFORT  1/15/21 21:30


    1/21/21 14:41





 


 Al Hydrox/Mg


 Hydrox/Simethicone


  (Mylanta)  30 ml  Q4HP  PRN


 PO


 HEARTBURN/INDIGESTION  1/15/21 21:30


     





 


 Home Med


  (Med Rec


 Complete!)    ASDIRECTED


 XX


   1/15/21 12:00


 1/15/21 11:58 DC  





 


 Magnesium


 Hydroxide


  (Milk Of


 Magnesia)  30 ml  DAILYPRN  PRN


 PO


 CONSTIPATION  1/15/21 21:30


     





 


 Olanzapine


  (ZyPREXA


 **ZYDIS**)  5 mg  Q4HP  PRN


 PO


 anixety/agitation  1/15/21 23:30


     





 


 Trazodone HCl


  (Desyrel)  25 mg  QHS


 PO


   1/20/21 21:00


    1/20/21 22:15





 


 Trazodone HCl


  (Desyrel)  25 mg  QHSP  PRN


 PO


 INSOMNIA  1/17/21 14:00


 1/20/21 18:08 DC 1/19/21 22:34





 


 Trazodone HCl


  (Desyrel)  50 mg  QHSP  PRN


 PO


 INSOMNIA  1/15/21 21:30


 1/17/21 13:54 DC 1/16/21 22:00














Allergies


Coded Allergies:  


     No Known Allergies (Unverified , 6/17/20)











CHELSEA ALY MD        Jan 21, 2021 19:26

## 2021-01-22 VITALS — DIASTOLIC BLOOD PRESSURE: 59 MMHG | SYSTOLIC BLOOD PRESSURE: 103 MMHG

## 2021-01-22 NOTE — MHDSPDOC
[facility] Discharge Summary


Discharge Summary


DATE OF ADMISSION: Laureano 15, 2021 at 21:17 


DATE OF DISCHARGE: Jan 22, 2021 at 12:55





DISCHARGE DIAGNOSES:


Unspecified Depressive disorder





REASON FOR ADMISSION: 





Yonny is a 20 year old  male with an unclear past psychiatric history

who was admitted to [facility] mental health service after what appeared to be a 

self-injurious episode following three days of sleep deprivation.  





CONSULTANTS INVOLVED:  Hospital/Medical Specialist and prior Psychiatrist, Dr. Cooper.  Writer took over his care two days ago.





HOSPITAL COURSE/TREATMENT AND PROGRESS ON THE UNIT:





Yonny initially was somewhat guarded upon first being admitted to the [facility].  

Due to him not sleeping for several days, he was somewhat anxious/depressed and 

irritable.  Upon building up more trust with the treatment team on the unit, he 

began to engage more in both individual and group community activities.  He was 

started on a non-habit forming sleeping aide, Trazodone, at a dose of 25 mg at 

bedtime.  This is the starting dose and it did not need to be further titrated 

upwards as this starting dose proved to give him very restful sleep.  He denied 

any hypersomnolence the next morning.  By the third day of restful sleep, he 

reported his mood as much improved and was very pleasant with all staff and 

peers on the unit throughout.  He is now at his functional baseline and is ready

for discharge at this time.  He adamantly denied any passive or active suicidal 

or homicidal ideation, intent or plan throughout the course of his 

hospitalization.








DISCHARGE ASSESSMENT: 





Janell symptoms did prove to appear to be most related to him being sleep 

deprived for over one week versus actually being a primary depressive disorder. 

Once he was able to have restful sleep regained, his mood was much 

improved/restored as was his functional state.





MENTAL STATUS EXAMINATION ON DISCHARGE: 


He is a 20 year old well-nourished male who was seen and evaluated in the 

community room; He is alert and oriented times three, tall stature, thin body 

habitus, appears stated age, good hygiene, wearing casual clothes with good eye 

contact;  Speech is normal in production, rate and soft in volume.  Mood: ready

to finally get out of here, affect: pleasant; TP are circumstantial but for the

most part, goal directed.  Denies any current passive or active suicidal or 

homicidal ideation, intent or plan.  Denies any AH/VH/TH; Denies any delusional 

themes;  Insight, impulse control and judgment are improved.





MEDICATIONS ON DISCHARGE:


Trazodone 25 mg by mouth at bedtime





PLAN/FOLLOWUP ARRANGEMENTS: 





Extensive psychoeducation was given to Yonny in regards to all risks, benefits

and alternatives to all modalities of treatment and he vocalized full under

standing.  He was given a 7 day supply with 4 refills of Trazodone 25 mg at 

bedtime.  (Total quantity of 28 tablets)  Discharge planners gave him a follow 

up behavioral health appointment and medical appointment at his residence 

location in [place].  He was instructed to call 911 or go to the nearest ER if

he begins to experience any passive or active suicidal/homicidal ideation, 

intent or plan.  





The amount of time spent in the coordination of care for this patient was 

approximately 90 minutes.





Vital Signs/I&Os





Vital Signs








  Date Time  Temp Pulse Resp B/P (MAP) Pulse Ox O2 Delivery O2 Flow Rate FiO2


 


1/22/21 06:00 98.5 51 16 103/59 (74) 100   


 


1/20/21 16:32      Room Air  











Laboratory Data


Microbiology





Microbiology


1/15/21 Respiratory Virus Panel (PCR) (DOTTY) - Final, Complete





Medications


Scheduled


Trazodone HCl (Trazodone HCl) 50 Mg Tablet, 25 MG PO QHS for Insomnia for 7 

Days, #7





Allergies


Coded Allergies:  


     No Known Allergies (Unverified , 6/17/20)











CHELSEA ALY MD        Jan 22, 2021 14:52

## 2021-01-22 NOTE — MHIPN
[facility] PROGRESS NOTE



DATE:  01/19/2021



I was assigned to his care today.  This is a video assessment.



VITAL SIGNS:  Blood pressure 116/55, pulse 56, temperature 98.9.



He is seen in the presence of staff.



CHIEF COMPLAINT:  Says feels good.



SUBJECTIVE:  Seen for followup.  Indicates continues to feel good, and that

moods have generally been good.  He slept well, he says he used trazodone at 25

mg at night.



Moods have been good.  He has been eating well.



He acknowledges there are times when he has had periods without much sleep, and

has had the energy to do things, does not think he has an elated mood, during

that time, but his mind races, takes up projects, manages them, is unsure how

long this lasts, sometimes he is thinking of such a period.



Says this tends to end with him coming down slowly rather than "crashing."



Collateral information obtained by staff from patient's father indicated that

patient had been treated for attention deficit hyperactivity disorder, was on

medicine, through his teenage years, had stopped at the age of 16, apparently

his mother had wanted him to stop.



MENTAL STATUS EXAMINATION:  Neat, cooperative, no agitation, no psychomotor

retardation, coherent, affect fairly broad.  No evidence of any thoughts of

harming himself or anyone else at present, nor of any psychosis.  Cognition is

grossly intact.  His judgment is good, insight is improved.



ASSESSMENT: 

His condition is more in keeping with bipolar disorder, possibly, a variant,

rather than major depressive disorder, it is quite possible he does have a mood

disorder.



Typically, remains improved, more rested, this tends to help.



At present, not as distressed as when he came in.



PLAN:  Continue trazodone 25 mg at night as needed.



Due to the above, I would not suggest that he uses an antidepressant, as that

may worsen mood fluctuations.  Consideration should be given to using a mood

stabilizer, to help with the fluctuations.



He would rather wait, and at this point I think that is fair.  This can be

decided in the outpatient setting, at [place].  If the patient shows

progress, he ought to be discharged in the next couple of days or so, at this

point.



Further recommendations will be made depending on the clinical picture, we will

encourage him to participate in activities as indicated.



He is aware that I am not assigned to his care tomorrow, as I am away.

OMAR

## 2021-04-08 ENCOUNTER — HOSPITAL ENCOUNTER (EMERGENCY)
Dept: HOSPITAL 53 - M ED | Age: 21
Discharge: HOME | End: 2021-04-08
Payer: COMMERCIAL

## 2021-04-08 VITALS — SYSTOLIC BLOOD PRESSURE: 107 MMHG | DIASTOLIC BLOOD PRESSURE: 79 MMHG

## 2021-04-08 VITALS — WEIGHT: 150.31 LBS | HEIGHT: 74 IN | BODY MASS INDEX: 19.29 KG/M2

## 2021-04-08 DIAGNOSIS — F17.210: ICD-10-CM

## 2021-04-08 DIAGNOSIS — R19.5: ICD-10-CM

## 2021-04-08 DIAGNOSIS — R31.0: Primary | ICD-10-CM

## 2021-04-08 LAB
ALBUMIN SERPL BCG-MCNC: 4.9 GM/DL (ref 3.2–5.2)
ALT SERPL W P-5'-P-CCNC: 17 U/L (ref 12–78)
BASOPHILS # BLD AUTO: 0 10^3/UL (ref 0–0.2)
BASOPHILS NFR BLD AUTO: 0.2 % (ref 0–1)
BILIRUB CONJ SERPL-MCNC: 0.2 MG/DL (ref 0–0.2)
BILIRUB SERPL-MCNC: 0.8 MG/DL (ref 0.2–1)
BUN SERPL-MCNC: 26 MG/DL (ref 7–18)
CALCIUM SERPL-MCNC: 9.3 MG/DL (ref 8.5–10.1)
CHLAMYDIA DNA AMPLIFICATION: NEGATIVE
CHLORIDE SERPL-SCNC: 108 MEQ/L (ref 98–107)
CK SERPL-CCNC: 202 U/L (ref 39–308)
CO2 SERPL-SCNC: 29 MEQ/L (ref 21–32)
CREAT SERPL-MCNC: 0.86 MG/DL (ref 0.7–1.3)
EOSINOPHIL # BLD AUTO: 0 10^3/UL (ref 0–0.5)
EOSINOPHIL NFR BLD AUTO: 0.2 % (ref 0–3)
GFR SERPL CREATININE-BSD FRML MDRD: > 60 ML/MIN/{1.73_M2} (ref 60–?)
GLUCOSE SERPL-MCNC: 92 MG/DL (ref 70–100)
HCT VFR BLD AUTO: 43.7 % (ref 42–52)
HGB BLD-MCNC: 14.4 G/DL (ref 13.5–17.5)
LIPASE SERPL-CCNC: 87 U/L (ref 73–393)
LYMPHOCYTES # BLD AUTO: 1.5 10^3/UL (ref 1.5–5)
LYMPHOCYTES NFR BLD AUTO: 15.8 % (ref 24–44)
MCH RBC QN AUTO: 29.9 PG (ref 27–33)
MCHC RBC AUTO-ENTMCNC: 33 G/DL (ref 32–36.5)
MCV RBC AUTO: 90.9 FL (ref 80–96)
MONOCYTES # BLD AUTO: 0.6 10^3/UL (ref 0–0.8)
MONOCYTES NFR BLD AUTO: 6.3 % (ref 2–8)
N GONORRHOEA RRNA SPEC QL NAA+PROBE: NEGATIVE
NEUTROPHILS # BLD AUTO: 7.4 10^3/UL (ref 1.5–8.5)
NEUTROPHILS NFR BLD AUTO: 77.3 % (ref 36–66)
PLATELET # BLD AUTO: 217 10^3/UL (ref 150–450)
POTASSIUM SERPL-SCNC: 4.6 MEQ/L (ref 3.5–5.1)
PROT SERPL-MCNC: 7.4 GM/DL (ref 6.4–8.2)
RBC # BLD AUTO: 4.81 10^6/UL (ref 4.3–6.1)
SODIUM SERPL-SCNC: 140 MEQ/L (ref 136–145)
WBC # BLD AUTO: 9.6 10^3/UL (ref 4–10)

## 2021-04-08 NOTE — REP
INDICATION:

hematuria, r low back pain, r/o stone.



COMPARISON:

None.



TECHNIQUE:

Renal ultrasound.



FINDINGS:

The right kidney measures 11.4 x 4.9 x 3.8 cm.

Left kidney measures 12.3 x 4.6 x 4.4 cm.

The kidneys are normal size.

Renal cortical echogenicity is normal bilaterally.

There is no hydronephrosis on the right or the left.

There are no renal calculi.  There are no solid or cystic renal masses.

Bladder:

The bladder is empty and cannot be evaluated.



IMPRESSION:

Essentially negative renal ultrasound.





<Electronically signed by Jose Kimble > 04/08/21 [id number]

## 2021-05-10 ENCOUNTER — HOSPITAL ENCOUNTER (EMERGENCY)
Dept: HOSPITAL 53 - M ED | Age: 21
Discharge: LEFT BEFORE DISCHARGE | End: 2021-05-10
Payer: COMMERCIAL

## 2021-05-10 VITALS — WEIGHT: 150.31 LBS | HEIGHT: 74 IN | BODY MASS INDEX: 19.29 KG/M2

## 2021-05-10 VITALS — SYSTOLIC BLOOD PRESSURE: 113 MMHG | DIASTOLIC BLOOD PRESSURE: 59 MMHG

## 2021-05-10 DIAGNOSIS — Z53.21: Primary | ICD-10-CM
